# Patient Record
Sex: MALE | Race: WHITE | NOT HISPANIC OR LATINO | Employment: FULL TIME | URBAN - METROPOLITAN AREA
[De-identification: names, ages, dates, MRNs, and addresses within clinical notes are randomized per-mention and may not be internally consistent; named-entity substitution may affect disease eponyms.]

---

## 2019-06-04 ENCOUNTER — HOSPITAL ENCOUNTER (EMERGENCY)
Facility: HOSPITAL | Age: 29
End: 2019-06-06
Attending: EMERGENCY MEDICINE | Admitting: EMERGENCY MEDICINE
Payer: COMMERCIAL

## 2019-06-04 DIAGNOSIS — F32.A DEPRESSION: Primary | ICD-10-CM

## 2019-06-04 DIAGNOSIS — R44.3 HALLUCINATIONS: ICD-10-CM

## 2019-06-04 LAB
AMPHETAMINES SERPL QL SCN: NEGATIVE
BARBITURATES UR QL: NEGATIVE
BENZODIAZ UR QL: NEGATIVE
COCAINE UR QL: NEGATIVE
ETHANOL EXG-MCNC: 0 MG/DL
METHADONE UR QL: NEGATIVE
OPIATES UR QL SCN: NEGATIVE
PCP UR QL: NEGATIVE
THC UR QL: NEGATIVE

## 2019-06-04 PROCEDURE — 99285 EMERGENCY DEPT VISIT HI MDM: CPT

## 2019-06-04 PROCEDURE — 82075 ASSAY OF BREATH ETHANOL: CPT | Performed by: EMERGENCY MEDICINE

## 2019-06-04 PROCEDURE — 80307 DRUG TEST PRSMV CHEM ANLYZR: CPT | Performed by: EMERGENCY MEDICINE

## 2019-06-04 PROCEDURE — 99284 EMERGENCY DEPT VISIT MOD MDM: CPT | Performed by: EMERGENCY MEDICINE

## 2019-06-05 RX ORDER — NICOTINE 21 MG/24HR
21 PATCH, TRANSDERMAL 24 HOURS TRANSDERMAL ONCE
Status: DISCONTINUED | OUTPATIENT
Start: 2019-06-05 | End: 2019-06-06 | Stop reason: HOSPADM

## 2019-06-05 RX ORDER — ACETAMINOPHEN 325 MG/1
975 TABLET ORAL ONCE
Status: COMPLETED | OUTPATIENT
Start: 2019-06-05 | End: 2019-06-05

## 2019-06-05 RX ORDER — ACETAMINOPHEN 325 MG/1
650 TABLET ORAL ONCE
Status: COMPLETED | OUTPATIENT
Start: 2019-06-05 | End: 2019-06-05

## 2019-06-05 RX ORDER — TRAZODONE HYDROCHLORIDE 50 MG/1
50 TABLET ORAL
COMMUNITY

## 2019-06-05 RX ORDER — QUETIAPINE FUMARATE 25 MG/1
25 TABLET, FILM COATED ORAL 2 TIMES DAILY
COMMUNITY

## 2019-06-05 RX ORDER — IBUPROFEN 600 MG/1
600 TABLET ORAL ONCE
Status: COMPLETED | OUTPATIENT
Start: 2019-06-05 | End: 2019-06-05

## 2019-06-05 RX ORDER — OLANZAPINE 10 MG/1
10 TABLET, ORALLY DISINTEGRATING ORAL ONCE
Status: COMPLETED | OUTPATIENT
Start: 2019-06-05 | End: 2019-06-05

## 2019-06-05 RX ADMIN — ACETAMINOPHEN 650 MG: 325 TABLET ORAL at 12:03

## 2019-06-05 RX ADMIN — ACETAMINOPHEN 975 MG: 325 TABLET ORAL at 00:42

## 2019-06-05 RX ADMIN — IBUPROFEN 600 MG: 600 TABLET ORAL at 00:42

## 2019-06-05 RX ADMIN — NICOTINE 21 MG: 21 PATCH, EXTENDED RELEASE TRANSDERMAL at 11:29

## 2019-06-05 RX ADMIN — OLANZAPINE 10 MG: 10 TABLET, ORALLY DISINTEGRATING ORAL at 23:22

## 2019-06-06 VITALS
SYSTOLIC BLOOD PRESSURE: 114 MMHG | HEART RATE: 62 BPM | DIASTOLIC BLOOD PRESSURE: 56 MMHG | TEMPERATURE: 98.3 F | RESPIRATION RATE: 16 BRPM | OXYGEN SATURATION: 97 %

## 2022-12-12 ENCOUNTER — HOSPITAL ENCOUNTER (INPATIENT)
Facility: HOSPITAL | Age: 32
LOS: 9 days | Discharge: HOME | End: 2022-12-21
Attending: EMERGENCY MEDICINE | Admitting: STUDENT IN AN ORGANIZED HEALTH CARE EDUCATION/TRAINING PROGRAM

## 2022-12-12 ENCOUNTER — APPOINTMENT (EMERGENCY)
Dept: NON INVASIVE DIAGNOSTICS | Facility: HOSPITAL | Age: 32
End: 2022-12-12

## 2022-12-12 ENCOUNTER — APPOINTMENT (EMERGENCY)
Dept: RADIOLOGY | Facility: HOSPITAL | Age: 32
End: 2022-12-12

## 2022-12-12 ENCOUNTER — APPOINTMENT (EMERGENCY)
Dept: CT IMAGING | Facility: HOSPITAL | Age: 32
End: 2022-12-12

## 2022-12-12 DIAGNOSIS — R79.89 ELEVATED LFTS: ICD-10-CM

## 2022-12-12 DIAGNOSIS — E87.1 HYPONATREMIA: ICD-10-CM

## 2022-12-12 DIAGNOSIS — F19.10 SUBSTANCE ABUSE (HCC): ICD-10-CM

## 2022-12-12 DIAGNOSIS — M62.82 RHABDOMYOLYSIS: Primary | ICD-10-CM

## 2022-12-12 DIAGNOSIS — F17.200 TOBACCO DEPENDENCE: ICD-10-CM

## 2022-12-12 DIAGNOSIS — N19 KIDNEY FAILURE: ICD-10-CM

## 2022-12-12 DIAGNOSIS — M79.662 PAIN AND SWELLING OF LEFT LOWER LEG: ICD-10-CM

## 2022-12-12 DIAGNOSIS — B19.20 HEPATITIS C VIRUS INFECTION WITHOUT HEPATIC COMA: ICD-10-CM

## 2022-12-12 DIAGNOSIS — R93.0 ABNORMAL CT SCAN OF HEAD: ICD-10-CM

## 2022-12-12 DIAGNOSIS — M79.89 PAIN AND SWELLING OF LEFT LOWER LEG: ICD-10-CM

## 2022-12-12 DIAGNOSIS — N17.9 AKI (ACUTE KIDNEY INJURY) (HCC): ICD-10-CM

## 2022-12-12 DIAGNOSIS — E86.0 DEHYDRATION: ICD-10-CM

## 2022-12-12 PROBLEM — M79.606 LEG PAIN: Status: ACTIVE | Noted: 2022-12-12

## 2022-12-12 LAB
ALBUMIN SERPL BCP-MCNC: 3.3 G/DL (ref 3.5–5)
ALP SERPL-CCNC: 73 U/L (ref 46–116)
ALT SERPL W P-5'-P-CCNC: 593 U/L (ref 12–78)
AMORPH URATE CRY URNS QL MICRO: NORMAL /HPF
AMPHETAMINES SERPL QL SCN: NEGATIVE
ANION GAP SERPL CALCULATED.3IONS-SCNC: 14 MMOL/L (ref 4–13)
AST SERPL W P-5'-P-CCNC: 1848 U/L (ref 5–45)
BACTERIA UR QL AUTO: NORMAL /HPF
BARBITURATES UR QL: NEGATIVE
BASOPHILS # BLD AUTO: 0.01 THOUSANDS/ÂΜL (ref 0–0.1)
BASOPHILS NFR BLD AUTO: 0 % (ref 0–1)
BENZODIAZ UR QL: NEGATIVE
BILIRUB SERPL-MCNC: 0.39 MG/DL (ref 0.2–1)
BILIRUB UR QL STRIP: NEGATIVE
BUN SERPL-MCNC: 86 MG/DL (ref 5–25)
CALCIUM ALBUM COR SERPL-MCNC: 8.9 MG/DL (ref 8.3–10.1)
CALCIUM SERPL-MCNC: 8.3 MG/DL (ref 8.3–10.1)
CHLORIDE SERPL-SCNC: 94 MMOL/L (ref 96–108)
CK MB SERPL-MCNC: 544.7 NG/ML (ref 0–5)
CK MB SERPL-MCNC: <1 % (ref 0–2.5)
CK SERPL-CCNC: ABNORMAL U/L (ref 39–308)
CLARITY UR: CLEAR
CO2 SERPL-SCNC: 20 MMOL/L (ref 21–32)
COCAINE UR QL: POSITIVE
COLOR UR: YELLOW
CREAT SERPL-MCNC: 6.46 MG/DL (ref 0.6–1.3)
EOSINOPHIL # BLD AUTO: 0 THOUSAND/ÂΜL (ref 0–0.61)
EOSINOPHIL NFR BLD AUTO: 0 % (ref 0–6)
ERYTHROCYTE [DISTWIDTH] IN BLOOD BY AUTOMATED COUNT: 14 % (ref 11.6–15.1)
GFR SERPL CREATININE-BSD FRML MDRD: 10 ML/MIN/1.73SQ M
GLUCOSE SERPL-MCNC: 98 MG/DL (ref 65–140)
GLUCOSE UR STRIP-MCNC: NEGATIVE MG/DL
HCT VFR BLD AUTO: 37.4 % (ref 36.5–49.3)
HGB BLD-MCNC: 12.7 G/DL (ref 12–17)
HGB UR QL STRIP.AUTO: ABNORMAL
IMM GRANULOCYTES # BLD AUTO: 0.06 THOUSAND/UL (ref 0–0.2)
IMM GRANULOCYTES NFR BLD AUTO: 1 % (ref 0–2)
INR PPP: 1.02 (ref 0.84–1.19)
KETONES UR STRIP-MCNC: NEGATIVE MG/DL
LEUKOCYTE ESTERASE UR QL STRIP: NEGATIVE
LYMPHOCYTES # BLD AUTO: 1.28 THOUSANDS/ÂΜL (ref 0.6–4.47)
LYMPHOCYTES NFR BLD AUTO: 12 % (ref 14–44)
MCH RBC QN AUTO: 29.2 PG (ref 26.8–34.3)
MCHC RBC AUTO-ENTMCNC: 34 G/DL (ref 31.4–37.4)
MCV RBC AUTO: 86 FL (ref 82–98)
METHADONE UR QL: NEGATIVE
MONOCYTES # BLD AUTO: 0.74 THOUSAND/ÂΜL (ref 0.17–1.22)
MONOCYTES NFR BLD AUTO: 7 % (ref 4–12)
NEUTROPHILS # BLD AUTO: 8.81 THOUSANDS/ÂΜL (ref 1.85–7.62)
NEUTS SEG NFR BLD AUTO: 80 % (ref 43–75)
NITRITE UR QL STRIP: NEGATIVE
NON-SQ EPI CELLS URNS QL MICRO: NORMAL /HPF
NRBC BLD AUTO-RTO: 0 /100 WBCS
OPIATES UR QL SCN: NEGATIVE
OXYCODONE+OXYMORPHONE UR QL SCN: NEGATIVE
PCP UR QL: NEGATIVE
PH UR STRIP.AUTO: 5 [PH] (ref 4.5–8)
PLATELET # BLD AUTO: 285 THOUSANDS/UL (ref 149–390)
PLATELET # BLD AUTO: 305 THOUSANDS/UL (ref 149–390)
PMV BLD AUTO: 9.4 FL (ref 8.9–12.7)
PMV BLD AUTO: 9.7 FL (ref 8.9–12.7)
POTASSIUM SERPL-SCNC: 5.1 MMOL/L (ref 3.5–5.3)
PROT SERPL-MCNC: 7.5 G/DL (ref 6.4–8.4)
PROT UR STRIP-MCNC: ABNORMAL MG/DL
PROTHROMBIN TIME: 13.4 SECONDS (ref 11.6–14.5)
RBC # BLD AUTO: 4.35 MILLION/UL (ref 3.88–5.62)
RBC #/AREA URNS AUTO: NORMAL /HPF
SODIUM SERPL-SCNC: 128 MMOL/L (ref 135–147)
SP GR UR STRIP.AUTO: 1.02 (ref 1–1.03)
THC UR QL: NEGATIVE
UROBILINOGEN UR QL STRIP.AUTO: 0.2 E.U./DL
WBC # BLD AUTO: 10.9 THOUSAND/UL (ref 4.31–10.16)
WBC #/AREA URNS AUTO: NORMAL /HPF

## 2022-12-12 RX ORDER — ACETAMINOPHEN 325 MG/1
650 TABLET ORAL EVERY 6 HOURS PRN
Status: DISCONTINUED | OUTPATIENT
Start: 2022-12-12 | End: 2022-12-12

## 2022-12-12 RX ORDER — OXYCODONE HYDROCHLORIDE 10 MG/1
10 TABLET ORAL EVERY 6 HOURS PRN
Status: DISCONTINUED | OUTPATIENT
Start: 2022-12-12 | End: 2022-12-21 | Stop reason: HOSPADM

## 2022-12-12 RX ORDER — OXYCODONE HYDROCHLORIDE 5 MG/1
5 TABLET ORAL EVERY 6 HOURS PRN
Status: DISCONTINUED | OUTPATIENT
Start: 2022-12-12 | End: 2022-12-21 | Stop reason: HOSPADM

## 2022-12-12 RX ORDER — OXYCODONE HYDROCHLORIDE 10 MG/1
10 TABLET ORAL EVERY 4 HOURS PRN
Status: DISCONTINUED | OUTPATIENT
Start: 2022-12-12 | End: 2022-12-12

## 2022-12-12 RX ORDER — SODIUM CHLORIDE, SODIUM GLUCONATE, SODIUM ACETATE, POTASSIUM CHLORIDE, MAGNESIUM CHLORIDE, SODIUM PHOSPHATE, DIBASIC, AND POTASSIUM PHOSPHATE .53; .5; .37; .037; .03; .012; .00082 G/100ML; G/100ML; G/100ML; G/100ML; G/100ML; G/100ML; G/100ML
125 INJECTION, SOLUTION INTRAVENOUS CONTINUOUS
Status: DISCONTINUED | OUTPATIENT
Start: 2022-12-12 | End: 2022-12-13

## 2022-12-12 RX ORDER — OXYCODONE HYDROCHLORIDE 5 MG/1
5 TABLET ORAL EVERY 4 HOURS PRN
Status: DISCONTINUED | OUTPATIENT
Start: 2022-12-12 | End: 2022-12-12

## 2022-12-12 RX ORDER — HYDROMORPHONE HCL/PF 1 MG/ML
0.5 SYRINGE (ML) INJECTION EVERY 6 HOURS PRN
Status: DISCONTINUED | OUTPATIENT
Start: 2022-12-12 | End: 2022-12-21 | Stop reason: HOSPADM

## 2022-12-12 RX ORDER — HEPARIN SODIUM 5000 [USP'U]/ML
5000 INJECTION, SOLUTION INTRAVENOUS; SUBCUTANEOUS EVERY 8 HOURS SCHEDULED
Status: DISCONTINUED | OUTPATIENT
Start: 2022-12-12 | End: 2022-12-21 | Stop reason: HOSPADM

## 2022-12-12 RX ORDER — NICOTINE 21 MG/24HR
1 PATCH, TRANSDERMAL 24 HOURS TRANSDERMAL DAILY
Status: DISCONTINUED | OUTPATIENT
Start: 2022-12-13 | End: 2022-12-21 | Stop reason: HOSPADM

## 2022-12-12 RX ADMIN — HEPARIN SODIUM 5000 UNITS: 5000 INJECTION INTRAVENOUS; SUBCUTANEOUS at 17:00

## 2022-12-12 RX ADMIN — SODIUM CHLORIDE 1000 ML: 0.9 INJECTION, SOLUTION INTRAVENOUS at 14:11

## 2022-12-12 RX ADMIN — SODIUM CHLORIDE 1000 ML: 0.9 INJECTION, SOLUTION INTRAVENOUS at 12:09

## 2022-12-12 RX ADMIN — SODIUM CHLORIDE, SODIUM GLUCONATE, SODIUM ACETATE, POTASSIUM CHLORIDE AND MAGNESIUM CHLORIDE 125 ML/HR: 526; 502; 368; 37; 30 INJECTION, SOLUTION INTRAVENOUS at 19:39

## 2022-12-12 RX ADMIN — ACETAMINOPHEN 650 MG: 325 TABLET, FILM COATED ORAL at 17:14

## 2022-12-12 NOTE — ED ATTENDING ATTESTATION
12/12/2022  IErnestina DO, saw and evaluated the patient  I have discussed the patient with the resident/non-physician practitioner and agree with the resident's/non-physician practitioner's findings, Plan of Care, and MDM as documented in the resident's/non-physician practitioner's note, except where noted  All available labs and Radiology studies were reviewed  I was present for key portions of any procedure(s) performed by the resident/non-physician practitioner and I was immediately available to provide assistance  At this point I agree with the current assessment done in the Emergency Department  I have conducted an independent evaluation of this patient a history and physical is as follows:    32 yo M h/o polysubstance abuse presenting for evaluation of L leg pain  Pt recently in rehab, just left 12/6  He has contusion to forehead and L leg pain, he is not sure what happened  He denies any ETOH/drug use, states taking Suboxone as directed  Has been unable to get out of bed/not eating/drinking       Exam sig for frontal hematoma/abrasion, no open wound/ttp over temporal region, LLE: no skin changes, no crepitus, mild ttp to L thigh, distally NV intact, all compartments are soft    Workup sig for acute renal failure 2/2 rhabdo, aggressive IVF and admission    ED Course         Critical Care Time  Procedures
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
## 2022-12-12 NOTE — UTILIZATION REVIEW
NOTIFICATION OF INPATIENT ADMISSION   AUTHORIZATION REQUEST   SERVICING FACILITY:   [address] [place] [address], [address]  Tax ID: [id number]  NPI: [id number] ATTENDING PROVIDER:  Attending Name and NPI#: [facility] [[id number][facility]  Address: [address] [place] [address], [address]   Phone: [phone]     ADMISSION INFORMATION:  Place of Service: [place] [address]  Place of Service Code: 21  Inpatient Admission Date/Time: 12/12/22  3:53 PM  Discharge Date/Time: No discharge date for patient encounter  Admitting Diagnosis Code/Description:  Leg pain [M79 [id number]]     UTILIZATION REVIEW CONTACT:  Argelia Todd Utilization   Network Utilization Review Department  Phone: [phone]  Fax: [phone]  Email: [email or web address]  Contact for approvals/pending authorizations, clinical reviews, and discharge  PHYSICIAN ADVISORY SERVICES:  Medical Necessity Denial & Tojp-sc-Enwj Review  Phone: [phone]  Fax: [phone]  Email: [email or web address]
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
## 2022-12-12 NOTE — ASSESSMENT & PLAN NOTE
History of IV drug abuse, UDS positive for cocaine  Previously on Suboxone, has been off it for several days, will hold off at this time  Continue to monitor for withdrawals
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
## 2022-12-12 NOTE — ASSESSMENT & PLAN NOTE
Baseline creatinine of 1, elevated in setting of rhabdo  Appreciate nephro input, continue IV fluids

## 2022-12-12 NOTE — CASE MANAGEMENT
Case Management Assessment & Discharge Planning Note    Patient name Cristi Chaudhry  Location ED 15/ED 15 MRN [id number]  : 1990 Date 2022       Current Admission Date: 2022  Current Admission Diagnosis:Rhabdomyolysis   Patient Active Problem List    Diagnosis Date Noted   • Rhabdomyolysis 2022   • SUSANA (acute kidney injury) ([facility] [address] ) 2022   • Substance abuse ([facility] [address] ) 2022   • Leg pain 2022   • Elevated LFTs 2022      LOS (days): 0  Geometric Mean LOS (GMLOS) (days):   Days to GMLOS:     OBJECTIVE:    Risk of Unplanned Readmission Score: 13 72         Current admission status: Inpatient       Preferred Pharmacy:   [address] [place] [address] [address] [place] [id number]  Phone: [phone] Fax: [phone]    Primary Care Provider: Moraima Flor PA-C    Primary Insurance: Lizzie Cho [facility]  Secondary Insurance:     ASSESSMENT:  [facility] [address], [address] [place] [address] Representative   Primary Phone: [phone] (Home)               Advance Directives  Does patient have a [address] [place] [address] Avenue?: No  Was patient offered paperwork?: Yes (declined)  Does patient currently have a Health Care decision maker?: Yes, please see Health Care Proxy section  Does patient have Advance Directives?: No  Was patient offered paperwork?: Yes (declined)  Primary Contact: Amy Marte [phone]              Patient Information  Admitted from[de-identified] Home  Mental Status: Alert  During Assessment patient was accompanied by: Not accompanied during assessment  Assessment information provided by[de-identified] Patient  Primary Caregiver: Self  Support Systems: Self, Parent  [place] of Residence: [address] do you live in?: [address] [facility] entry access options   Select all that apply : No steps to enter home  Type of Current Residence: [place] Agosto  In the last 12 months, was there a time when you were not able to pay the mortgage or rent on time?: No  In the last 12 months, how many places have you lived?: 1  In the last 12 months, was there a time when you did not have a steady place to sleep or slept in a shelter (including now)?: No  Homeless/housing insecurity resource given?: N/A  Living Arrangements: Lives w/ Parent(s)  Is patient a ?: No    Activities of Daily Living Prior to Admission  Functional Status: Independent  Completes ADLs independently?: Yes  Ambulates independently?: Yes  Does patient use assisted devices?: No  Does patient currently own DME?: No  Does patient have a history of Outpatient Therapy (PT/OT)?: No  Does the patient have a history of Short-Term Rehab?: No  Does patient have a history of HHC?: No  Does patient currently have [facility]?: No         Patient Information Continued  Income Source: Employed  Does patient have prescription coverage?: Yes  Within the past 12 months, you worried that your food would run out before you got the money to buy more : Never true  Within the past 12 months, the food you bought just didn't last and you didn't have money to get more : Never true  Food insecurity resource given?: N/A  Does patient receive dialysis treatments?: No  Does patient have a history of substance abuse?: Yes  Historical substance use preference: Cocaine  History of Withdrawal Symptoms: Denies past symptoms  Is patient currently in treatment for substance abuse?: Yes (Pt was d/c from rehab on 12/6   Pt is enrolled in [facility])  Does patient have a history of Mental Health Diagnosis?: No         Means of Transportation  Means of Transport to Appts[de-identified] Family transport  In the past 12 months, has lack of transportation kept you from medical appointments or from getting medications?: No  In the past 12 months, has lack of transportation kept you from meetings, work, or from getting things needed for daily living?: No  Was application for public transport provided?: N/A        DISCHARGE DETAILS:    Discharge planning discussed with[de-identified] Patient  Freedom of Choice: Yes     CM contacted family/caregiver?: No- see comments (declined)                  Requested [address] [facility]         Is the patient interested in [facility] at discharge?: No    DME Referral Provided  Referral made for DME?: No                                                           Additional Comments: CM met with pt at bedside  Pt denied needing any information for D+A and was not interested in HOST information  Pt reported being in treatment through [facility]  Pt reported family transport home once ready for d/c

## 2022-12-13 PROBLEM — D64.9 ANEMIA: Status: ACTIVE | Noted: 2022-12-13

## 2022-12-13 LAB
ALBUMIN SERPL BCP-MCNC: 2.8 G/DL (ref 3.5–5)
ALP SERPL-CCNC: 62 U/L (ref 46–116)
ALT SERPL W P-5'-P-CCNC: 463 U/L (ref 12–78)
ANION GAP SERPL CALCULATED.3IONS-SCNC: 18 MMOL/L (ref 4–13)
AST SERPL W P-5'-P-CCNC: 1121 U/L (ref 5–45)
BILIRUB SERPL-MCNC: 0.36 MG/DL (ref 0.2–1)
BUN SERPL-MCNC: 96 MG/DL (ref 5–25)
CALCIUM ALBUM COR SERPL-MCNC: 8.9 MG/DL (ref 8.3–10.1)
CALCIUM SERPL-MCNC: 7.9 MG/DL (ref 8.3–10.1)
CHLORIDE SERPL-SCNC: 98 MMOL/L (ref 96–108)
CK MB SERPL-MCNC: 180 NG/ML (ref 0–5)
CK MB SERPL-MCNC: 182.1 NG/ML (ref 0–5)
CK MB SERPL-MCNC: <1 % (ref 0–2.5)
CK MB SERPL-MCNC: <1 % (ref 0–2.5)
CK SERPL-CCNC: ABNORMAL U/L (ref 39–308)
CK SERPL-CCNC: ABNORMAL U/L (ref 39–308)
CO2 SERPL-SCNC: 17 MMOL/L (ref 21–32)
CREAT SERPL-MCNC: 7.58 MG/DL (ref 0.6–1.3)
ERYTHROCYTE [DISTWIDTH] IN BLOOD BY AUTOMATED COUNT: 13.9 % (ref 11.6–15.1)
FLUAV RNA RESP QL NAA+PROBE: NEGATIVE
FLUBV RNA RESP QL NAA+PROBE: NEGATIVE
GFR SERPL CREATININE-BSD FRML MDRD: 8 ML/MIN/1.73SQ M
GLUCOSE SERPL-MCNC: 95 MG/DL (ref 65–140)
HCT VFR BLD AUTO: 31.6 % (ref 36.5–49.3)
HGB BLD-MCNC: 10.8 G/DL (ref 12–17)
MCH RBC QN AUTO: 28.8 PG (ref 26.8–34.3)
MCHC RBC AUTO-ENTMCNC: 34.2 G/DL (ref 31.4–37.4)
MCV RBC AUTO: 84 FL (ref 82–98)
PLATELET # BLD AUTO: 279 THOUSANDS/UL (ref 149–390)
PMV BLD AUTO: 9.4 FL (ref 8.9–12.7)
POTASSIUM SERPL-SCNC: 4.6 MMOL/L (ref 3.5–5.3)
PROT SERPL-MCNC: 6.5 G/DL (ref 6.4–8.4)
RBC # BLD AUTO: 3.75 MILLION/UL (ref 3.88–5.62)
RSV RNA RESP QL NAA+PROBE: NEGATIVE
SARS-COV-2 RNA RESP QL NAA+PROBE: NEGATIVE
SODIUM SERPL-SCNC: 133 MMOL/L (ref 135–147)
WBC # BLD AUTO: 9.04 THOUSAND/UL (ref 4.31–10.16)

## 2022-12-13 RX ADMIN — SODIUM CHLORIDE, SODIUM GLUCONATE, SODIUM ACETATE, POTASSIUM CHLORIDE AND MAGNESIUM CHLORIDE 125 ML/HR: 526; 502; 368; 37; 30 INJECTION, SOLUTION INTRAVENOUS at 07:01

## 2022-12-13 RX ADMIN — HEPARIN SODIUM 5000 UNITS: 5000 INJECTION INTRAVENOUS; SUBCUTANEOUS at 01:28

## 2022-12-13 RX ADMIN — HEPARIN SODIUM 5000 UNITS: 5000 INJECTION INTRAVENOUS; SUBCUTANEOUS at 08:28

## 2022-12-13 RX ADMIN — SODIUM BICARBONATE 125 ML/HR: 84 INJECTION, SOLUTION INTRAVENOUS at 11:36

## 2022-12-13 RX ADMIN — HEPARIN SODIUM 5000 UNITS: 5000 INJECTION INTRAVENOUS; SUBCUTANEOUS at 14:09

## 2022-12-13 RX ADMIN — HEPARIN SODIUM 5000 UNITS: 5000 INJECTION INTRAVENOUS; SUBCUTANEOUS at 22:09

## 2022-12-13 RX ADMIN — SODIUM BICARBONATE 125 ML/HR: 84 INJECTION, SOLUTION INTRAVENOUS at 20:25

## 2022-12-13 NOTE — ASSESSMENT & PLAN NOTE
· History of IV drug abuse, UDS positive for cocaine  · Previously on Suboxone, has been off it for several days, will hold off at this time  · Continue to monitor for withdrawals

## 2022-12-13 NOTE — PROGRESS NOTES
[facility]  Progress Note - Leida Garcia 1990, 33 y o  male MRN: [id number]  Unit/Bed#: ED 15 Encounter: [id number]  Primary Care Provider: Rema Chase PA-C   Date and time admitted to hospital: 12/12/2022 11:12 AM    * Rhabdomyolysis  Assessment & Plan  · Patient presented for LLE pain and tingling especially in the gluteal region  States he "woke up in pain" but doesn't recall inciting events  Does have facial scrapes so cannot exclude trauma   · CK > 70,000 decreased to 50,000 on IVF   · Changed to sodium bicarb in the setting of acute renal failure and acidosis   · Kidney function unfortunately is worsening with creatinine greater than 7   · Consented by nephrology for possible renal replacement the next 24 to 48 hours  · 10 you IV fluids and to trend creatinine as well as CK  · UDS positive for cocaine, possibly contributing    SUSANA (acute kidney injury) ([facility] [address] )  Assessment & Plan  · Baseline creatinine of 1  · Unfortunately overnight worsened from 6 46 to 7 58  ·  elevated in setting of rhabdo  · Nephrology consulted   · Fluids changed to sodium bicarb given acidosis   · Consented for possible dialysis     Elevated LFTs  Assessment & Plan  · Likely in setting of rhabdo  · He does have a history of untreated hep c secondary to IVDU   · Given improvement will monitor x 1 day  · Consider GI consult if worsening   · No concerning liver pathology noted on CT     Anemia  Assessment & Plan  · Not many records but baseline appears to be around 12  · Currently 10 8   · Likely dilutional in the setting of aggressive fluids repletion due to rhabdo   · Monitor closely   · No clinical signs of bleeding       Leg pain  Assessment & Plan  · Doppler ultrasound negative for DVT   · CT imaging showing subcu edema, swelling with suspicions of post trauma  · Suspect likely due to myalgias in setting of rhabdo, low suspicion for infection, hold antibiotics  · Patient's lower extremities warm and well perfused with +2 pedal pulses so doubt compartment syndrome at this time  Substance abuse ([facility] [address] )  Assessment & Plan  · History of IV drug abuse, UDS positive for cocaine  · Previously on Suboxone, has been off it for several days, will hold off at this time  · Continue to monitor for withdrawals      VTE Pharmacologic Prophylaxis:   Moderate Risk (Score 3-4) - Pharmacological DVT Prophylaxis Ordered: heparin  Patient Centered Rounds: I performed bedside rounds with nursing staff today  Discussions with Specialists or Other Care Team Provider: GLADIS      Education and Discussions with Family / Patient: Updated  (mother) via phone  Time Spent for Care: 30 minutes  More than 50% of total time spent on counseling and coordination of care as described above  Current Length of Stay: 1 day(s)  Current Patient Status: Inpatient   Certification Statement: The patient will continue to require additional inpatient hospital stay due to pending improvement in renal function   Discharge Plan: Anticipate discharge in >72 hrs to pending clinical coarse     Code Status: Level 1 - Full Code    Subjective:   Patient is complaining of pins and needle like feeling in his buttock      Objective:     Vitals:   Temp (24hrs), Av 1 °F (36 7 °C), Min:97 7 °F (36 5 °C), Max:98 4 °F (36 9 °C)    Temp:  [97 7 °F (36 5 °C)-98 4 °F (36 9 °C)] 97 7 °F (36 5 °C)  HR:  [74-90] 83  Resp:  [18-22] 18  BP: (125-164)/(63-94) 138/71  SpO2:  [98 %-100 %] 99 %  Body mass index is 31 07 kg/m²  Input and Output Summary (last 24 hours): Intake/Output Summary (Last 24 hours) at 2022 1228  Last data filed at 2022 1136  Gross per 24 hour   Intake 4172 92 ml   Output 1175 ml   Net 2997 92 ml       Physical Exam:   Physical Exam  Vitals and nursing note reviewed  Constitutional:       General: He is not in acute distress  Appearance: He is ill-appearing  HENT:      Head: Normocephalic and atraumatic  Cardiovascular:      Rate and Rhythm: Normal rate and regular rhythm  Pulmonary:      Effort: Pulmonary effort is normal       Breath sounds: Normal breath sounds  Abdominal:      General: Abdomen is flat  Palpations: Abdomen is soft  Musculoskeletal:         General: Tenderness (left buttock tenderness ) present  Comments: + 2 pedal pulses    Skin:     General: Skin is warm and dry  Capillary Refill: Capillary refill takes less than 2 seconds  Neurological:      General: No focal deficit present  Mental Status: He is alert and oriented to person, place, and time  Psychiatric:         Mood and Affect: Mood normal           Additional Data:     Labs:  Results from last 7 days   Lab Units 12/13/22  0614 12/12/22  1659 12/12/22  1208   WBC Thousand/uL 9 04  --  10 90*   HEMOGLOBIN g/dL 10 8*  --  12 7   HEMATOCRIT % 31 6*  --  37 4   PLATELETS Thousands/uL 279   < > 305   NEUTROS PCT %  --   --  80*   LYMPHS PCT %  --   --  12*   MONOS PCT %  --   --  7   EOS PCT %  --   --  0    < > = values in this interval not displayed       Results from last 7 days   Lab Units 12/13/22  0614   SODIUM mmol/L 133*   POTASSIUM mmol/L 4 6   CHLORIDE mmol/L 98   CO2 mmol/L 17*   BUN mg/dL 96*   CREATININE mg/dL 7 58*   ANION GAP mmol/L 18*   CALCIUM mg/dL 7 9*   ALBUMIN g/dL 2 8*   TOTAL BILIRUBIN mg/dL 0 36   ALK PHOS U/L 62   ALT U/L 463*   AST U/L 1,121*   GLUCOSE RANDOM mg/dL 95     Results from last 7 days   Lab Units 12/12/22  1342   INR  1 02                   Lines/Drains:  Invasive Devices     Peripheral Intravenous Line  Duration           Peripheral IV 12/12/22 Right Antecubital 1 day    Peripheral IV 12/12/22 Left;Upper Arm <1 day                  Telemetry:  Telemetry Orders (From admission, onward)             24 Hour Telemetry Monitoring  (ED Bridging Orders Panel)  Continuous x 24 Hours (Telem)        Comments: Lab and electrolyte abnormalities   References:    Telemetry Guidelines Question:  Reason for 24 Hour Telemetry  Answer:  Metabolic/Electrolyte Disturbance with High Probability of Dysrhythmia (K level <3 or >6, or KCL infusion >=10mEq/hr)                 Telemetry Reviewed: Normal Sinus Rhythm  Indication for Continued Telemetry Use: No indication for continued use  Will discontinue  Imaging: No pertinent imaging reviewed  Recent Cultures (last 7 days):         Last 24 Hours Medication List:   Current Facility-Administered Medications   Medication Dose Route Frequency Provider Last Rate   • heparin (porcine)  5,000 Units Subcutaneous Q8H [facility] Juan Reynolds MD     • HYDROmorphone  0 5 mg Intravenous Q6H PRN Juan Reynolds MD     • nicotine  1 patch Transdermal Daily Juan Reynolds MD     • oxyCODONE  10 mg Oral Q6H PRN Juan Reynolds MD     • oxyCODONE  5 mg Oral Q6H PRGALO Reynolds MD     • sodium bicarbonate infusion  125 mL/hr Intravenous Continuous Claudell [place], CRNP 125 mL/hr (12/13/22 1136)   • sodium chloride  1,000 mL Intravenous Once Varsha Mukherjee PA-C          Today, Patient Was Seen By: Gibran García PA-C    **Please Note: This note may have been constructed using a voice recognition system  **

## 2022-12-13 NOTE — ASSESSMENT & PLAN NOTE
· Baseline creatinine of 1  · Unfortunately overnight worsened from 6 46 to 7 58  ·  elevated in setting of rhabdo  · Nephrology consulted   · Fluids changed to sodium bicarb given acidosis   · Consented for possible dialysis

## 2022-12-13 NOTE — ASSESSMENT & PLAN NOTE
· Doppler ultrasound negative for DVT   · CT imaging showing subcu edema, swelling with suspicions of post trauma  · Suspect likely due to myalgias in setting of rhabdo, low suspicion for infection, hold antibiotics  · Patient's lower extremities warm and well perfused with +2 pedal pulses so doubt compartment syndrome at this time

## 2022-12-13 NOTE — ASSESSMENT & PLAN NOTE
· Likely in setting of rhabdo  · He does have a history of untreated hep c secondary to IVDU   · Given improvement will monitor x 1 day  · Consider GI consult if worsening   · No concerning liver pathology noted on CT

## 2022-12-13 NOTE — ASSESSMENT & PLAN NOTE
· Patient presented for LLE pain and tingling especially in the gluteal region  States he "woke up in pain" but doesn't recall inciting events   Does have facial scrapes so cannot exclude trauma   · CK > 70,000 decreased to 50,000 on IVF   · Changed to sodium bicarb in the setting of acute renal failure and acidosis   · Kidney function unfortunately is worsening with creatinine greater than 7   · Consented by nephrology for possible renal replacement the next 24 to 48 hours  · 10 you IV fluids and to trend creatinine as well as CK  · UDS positive for cocaine, possibly contributing

## 2022-12-13 NOTE — ASSESSMENT & PLAN NOTE
· Not many records but baseline appears to be around 12  · Currently 10 8   · Likely dilutional in the setting of aggressive fluids repletion due to rhabdo   · Monitor closely   · No clinical signs of bleeding
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
## 2022-12-13 NOTE — PLAN OF CARE
Problem: PAIN - ADULT  Goal: Verbalizes/displays adequate comfort level or baseline comfort level  Description: Interventions:  - Encourage patient to monitor pain and request assistance  - Assess pain using appropriate pain scale  - Administer analgesics based on type and severity of pain and evaluate response  - Implement non-pharmacological measures as appropriate and evaluate response  - Consider cultural and social influences on pain and pain management  - Notify physician/advanced practitioner if interventions unsuccessful or patient reports new pain  12/13/2022 1414 by Sheila Cortes RN  Outcome: Progressing  12/13/2022 1412 by Sheila Cortes RN  Outcome: Progressing     Problem: INFECTION - ADULT  Goal: Absence or prevention of progression during hospitalization  Description: INTERVENTIONS:  - Assess and monitor for signs and symptoms of infection  - Monitor lab/diagnostic results  - Monitor all insertion sites, i e  indwelling lines, tubes, and drains  - Monitor endotracheal if appropriate and nasal secretions for changes in amount and color  - [place] appropriate cooling/warming therapies per order  - Administer medications as ordered  - Instruct and encourage patient and family to use good hand hygiene technique  - Identify and instruct in appropriate isolation precautions for identified infection/condition  12/13/2022 1414 by Sheila Cortes RN  Outcome: Progressing  12/13/2022 1412 by Sheila Cortes RN  Outcome: Progressing     Problem: SAFETY ADULT  Goal: Patient will remain free of falls  Description: INTERVENTIONS:  - Educate patient/family on patient safety including physical limitations  - Instruct patient to call for assistance with activity   - Consult OT/PT to assist with strengthening/mobility   - Keep Call bell within reach  - Keep bed low and locked with side rails adjusted as appropriate  - Keep care items and personal belongings within reach  - Initiate and maintain comfort rounds  - Apply yellow socks and bracelet for high fall risk patients  - Consider moving patient to room near nurses station  12/13/2022 1414 by Lexy Brewster RN  Outcome: Progressing  12/13/2022 1412 by Lexy Brewster RN  Outcome: Progressing  Goal: Maintain or return to baseline ADL function  Description: INTERVENTIONS:  -  Assess patient's ability to carry out ADLs; assess patient's baseline for ADL function and identify physical deficits which impact ability to perform ADLs (bathing, care of mouth/teeth, toileting, grooming, dressing, etc )  - Assess/evaluate cause of self-care deficits   - Assess range of motion  - Assess patient's mobility; develop plan if impaired  - Assess patient's need for assistive devices and provide as appropriate  - Encourage maximum independence but intervene and supervise when necessary  - Involve family in performance of ADLs  - Assess for home care needs following discharge   - Consider OT consult to assist with ADL evaluation and planning for discharge  - Provide patient education as appropriate  12/13/2022 1414 by Lexy Brewster RN  Outcome: Progressing  12/13/2022 1412 by Lexy Brewster RN  Outcome: Progressing  Goal: Maintains/Returns to pre admission functional level  Description: INTERVENTIONS:  - Perform BMAT or MOVE assessment daily    - Set and communicate daily mobility goal to care team and patient/family/caregiver     - Collaborate with rehabilitation services on mobility goals if consulted  - Out of bed for meals 3 times a day  - Out of bed for toileting  - Record patient progress and toleration of activity level   12/13/2022 1414 by Lexy Brewster RN  Outcome: Progressing  12/13/2022 1412 by Lexy Brewster RN  Outcome: Progressing     Problem: DISCHARGE PLANNING  Goal: Discharge to home or other facility with appropriate resources  Description: INTERVENTIONS:  - Identify barriers to discharge w/patient and caregiver  - Arrange for needed discharge resources and transportation as appropriate  - Identify discharge learning needs (meds, wound care, etc )  - Arrange for interpretive services to assist at discharge as needed  - Refer to Case Management Department for coordinating discharge planning if the patient needs post-hospital services based on physician/advanced practitioner order or complex needs related to functional status, cognitive ability, or social support system  12/13/2022 1414 by Luis Daniel Scmhid RN  Outcome: Progressing  12/13/2022 1412 by Luis Daniel Schmid RN  Outcome: Progressing     Problem: Knowledge Deficit  Goal: Patient/family/caregiver demonstrates understanding of disease process, treatment plan, medications, and discharge instructions  Description: Complete learning assessment and assess knowledge base    Interventions:  - Provide teaching at level of understanding  - Provide teaching via preferred learning methods  12/13/2022 1414 by Luis Daniel Schmid RN  Outcome: Progressing  12/13/2022 1412 by Luis Daniel Schmid RN  Outcome: Progressing     Problem: METABOLIC, FLUID AND ELECTROLYTES - ADULT  Goal: Electrolytes maintained within normal limits  Description: INTERVENTIONS:  - Monitor labs and assess patient for signs and symptoms of electrolyte imbalances  - Administer electrolyte replacement as ordered  - Monitor response to electrolyte replacements, including repeat lab results as appropriate  - Instruct patient on fluid and nutrition as appropriate  12/13/2022 1414 by Luis Daniel Schmid RN  Outcome: Progressing  12/13/2022 1412 by Luis Daniel Schmid RN  Outcome: Progressing  Goal: Fluid balance maintained  Description: INTERVENTIONS:  - Monitor labs   - Monitor I/O and WT  - Instruct patient on fluid and nutrition as appropriate  - Assess for signs & symptoms of volume excess or deficit  12/13/2022 1414 by Luis Daniel Schmid RN  Outcome: Progressing  12/13/2022 1412 by Luis Daniel Schmid RN  Outcome: Progressing     Problem: MOBILITY - ADULT  Goal: Maintain or return to baseline ADL function  Description: INTERVENTIONS:  -  Assess patient's ability to carry out ADLs; assess patient's baseline for ADL function and identify physical deficits which impact ability to perform ADLs (bathing, care of mouth/teeth, toileting, grooming, dressing, etc )  - Assess/evaluate cause of self-care deficits   - Assess range of motion  - Assess patient's mobility; develop plan if impaired  - Assess patient's need for assistive devices and provide as appropriate  - Encourage maximum independence but intervene and supervise when necessary  - Involve family in performance of ADLs  - Assess for home care needs following discharge   - Consider OT consult to assist with ADL evaluation and planning for discharge  - Provide patient education as appropriate  12/13/2022 1414 by Lauryn Matute RN  Outcome: Progressing  12/13/2022 1412 by Lauryn Matute RN  Outcome: Progressing  Goal: Maintains/Returns to pre admission functional level  Description: INTERVENTIONS:  - Perform BMAT or MOVE assessment daily    - Set and communicate daily mobility goal to care team and patient/family/caregiver     - Collaborate with rehabilitation services on mobility goals if consulted  - Out of bed for meals 3 times a day  - Out of bed for toileting  - Record patient progress and toleration of activity level   12/13/2022 1414 by Lauryn Matute RN  Outcome: Progressing  12/13/2022 1412 by Lauryn Matute RN  Outcome: Progressing

## 2022-12-13 NOTE — CONSULTS
Consultation - Nephrology   Jessica Shukla 33 y o  male MRN: [id number]  Unit/Bed#: ED 15 Encounter: [id number]    ASSESSMENT/PLAN:    Severe SUSANA (POA)  -Baseline creatinine: 0 8-0 9 as reviewed in Care Everywhere  -Creatinine on admission 6 46, most recent creatinine 7 58  -Etiology: Suspect pigment nephropathy second to rhabdomyolysis  -UA: Large blood, 2+ protein, no RBCs  -Renal imaging: CT abdomen pelvis with severe bladder wall thickening, no hydronephrosis  -Avoid hypotension, avoid nephrotoxins, avoid NSAIDS  -Trend BMP  -Urinary retention protocol, bladder scans  -Reviewed with nursing will require strict I's and O's, noted to have 700 mL urine output thus far this morning  -Discussed risks and benefits of renal replacement therapy including infection, arrhythmia, hypotension, sudden cardiac death    Consent obtained from patient and placed in chart  -no current indication for renal placement therapy at this time, high risk for requiring in the to next 24 to 48 hours  -Stop Plasma-Lyte, will utilize bicarb infusion given acidosis    Anion gap metabolic acidosis  -The setting of severe SUSANA  -Bicarb 17, anion gap 18  -Stop Plasma-Lyte as above, start bicarb drip    Rhabdomyolysis/left leg pain  -Unclear origin, no clear inciting event, however patient has poor recall of the events leading to his hospitalization  -CK 74,000 on admission most recently 50,000  -Lower extremity venous duplex negative for DVT  -CT abdomen pelvis with significant subcutaneous edema throughout the left upper thigh with enlargement of all surrounding muscles  -Management per primary team    Anemia  -Hemoglobin 10 8 from 12 7 on admission after IV fluid administration  -Recommend transfuse goal greater than 7 per primary team    Hyponatremia  -Sodium 128 on admission, most recently 133  -Suspect second to SUSANA/impaired free water excretion  -Continue IV fluid resuscitation, trend labs, if drops further we will send additional work-up at that time    Transaminitis  -Likely secondary to rhabdomyolysis    Additional Medical Problems: Substance abuse    Patient asked me to update his mother Narciso Gillette via phone, left a voicemail asking her to call back the hospital for further information    HISTORY OF PRESENT ILLNESS:  Requesting Physician: Jethro Mcneill MD  Reason for Consult: SUSANA Mejía is a 33y o  year old male who was admitted to [facility] after presenting with left leg pain and decreased urination  Patient has minimal recall of the events leading to his hospitalization however reports he had left leg pain for few days and noticed that he was not urinating as much as he normally does, he reports his appetite have been normal prior to this, denies any recent diarrhea  A renal consultation is requested today for assistance in the management of acute kidney injury  PAST MEDICAL HISTORY:  Past Medical History:   Diagnosis Date   • Hepatitis C        PAST SURGICAL HISTORY:  History reviewed  No pertinent surgical history  ALLERGIES:  No Known Allergies    SOCIAL HISTORY:  Social History     Substance and Sexual Activity   Alcohol Use Yes    Comment: occassional     Social History     Substance and Sexual Activity   Drug Use Yes   • Types: Heroin, Cocaine     Social History     Tobacco Use   Smoking Status Every Day   Smokeless Tobacco Never       FAMILY HISTORY:  History reviewed  No pertinent family history      MEDICATIONS:    Current Facility-Administered Medications:   •  heparin (porcine) subcutaneous injection 5,000 Units, 5,000 Units, Subcutaneous, Q8H [address], 5,000 Units at 12/13/22 0828 **AND** [COMPLETED] Platelet count, , , Once, Chen Adkins MD  •  HYDROmorphone (DILAUDID) injection 0 5 mg, 0 5 mg, Intravenous, Q6H PRN, Chen Adkins MD  •  nicotine (NICODERM CQ) 14 mg/24hr TD 24 hr patch 1 patch, 1 patch, Transdermal, Daily, Chen Adkins MD  •  oxyCODONE (ROXICODONE) immediate release tablet 10 mg, 10 mg, Oral, Q6H PRN, Scar Pike MD  •  oxyCODONE (ROXICODONE) IR tablet 5 mg, 5 mg, Oral, Q6H PRN, Scar Pike MD  •  sodium bicarbonate 150 mEq in dextrose 5 % 1,000 mL infusion, 125 mL/hr, Intravenous, Continuous, ERNESTO Dodson  •  sodium chloride 0 9 % bolus 1,000 mL, 1,000 mL, Intravenous, Once, Marion Aleman PA-C    Current Outpatient Medications:   •  Buprenorphine HCl-Naloxone HCl (SUBOXONE SL), Place under the tongue, Disp: , Rfl:   •  traZODone (DESYREL) 50 mg tablet, Take 50 mg by mouth daily at bedtime, Disp: , Rfl:   •  QUEtiapine (SEROquel) 25 mg tablet, Take 25 mg by mouth 2 (two) times a day (Patient not taking: Reported on 12/12/2022), Disp: , Rfl:     REVIEW OF SYSTEMS:  Review of Systems   Constitutional: Negative  HENT: Negative  Respiratory: Negative  Gastrointestinal: Negative  Genitourinary: Positive for decreased urine volume  Musculoskeletal:        Left leg pain      A complete 10 point review of systems was performed and found to be negative unless otherwise noted above or in the HPI  PHYSICAL EXAM:  Current Weight: Weight - Scale: 92 7 kg (204 lb 5 9 oz)  First Weight: Weight - Scale: 92 6 kg (204 lb 2 3 oz)  Vitals:    12/13/22 0328 12/13/22 0620 12/13/22 0813 12/13/22 0825   BP: 138/76 125/63 138/71    BP Location: Right arm Left arm Right arm    Pulse: 75 74 83    Resp: 18 18 18    Temp:   97 7 °F (36 5 °C)    TempSrc:   Oral    SpO2: 100% 100% 99%    Weight:    92 7 kg (204 lb 5 9 oz)   Height:           Intake/Output Summary (Last 24 hours) at 12/13/2022 1105  Last data filed at 12/13/2022 1001  Gross per 24 hour   Intake 3120 ml   Output 700 ml   Net 2420 ml     Physical Exam  Constitutional:       General: He is not in acute distress  Appearance: Normal appearance  He is not toxic-appearing or diaphoretic  HENT:      Head: Normocephalic          Nose: Nose normal       Mouth/Throat:      Mouth: Mucous membranes are moist    Eyes:      General: No scleral icterus  Cardiovascular:      Rate and Rhythm: Normal rate and regular rhythm  Pulses: Normal pulses  Pulmonary:      Effort: Pulmonary effort is normal  No respiratory distress  Breath sounds: Normal breath sounds  No wheezing or rales  Abdominal:      General: Abdomen is flat  There is no distension  Palpations: Abdomen is soft  Tenderness: There is no abdominal tenderness  Musculoskeletal:      Cervical back: Neck supple  Right lower leg: No edema  Left lower leg: No edema  Comments: Bilateral pedal pulses palpable  Bilateral lower extremities warm to touch with normal cap refill  Soft muscle compartments of lower extremity bilaterally   Skin:     General: Skin is warm and dry  Capillary Refill: Capillary refill takes less than 2 seconds  Neurological:      General: No focal deficit present  Mental Status: He is alert and oriented to person, place, and time  Invasive Devices:      Lab Results:   Results from last 7 days   Lab Units 12/13/22  0614 12/12/22  1659 12/12/22  1208   WBC Thousand/uL 9 04  --  10 90*   HEMOGLOBIN g/dL 10 8*  --  12 7   HEMATOCRIT % 31 6*  --  37 4   PLATELETS Thousands/uL 279 285 305   POTASSIUM mmol/L 4 6  --  5 1   CHLORIDE mmol/L 98  --  94*   CO2 mmol/L 17*  --  20*   BUN mg/dL 96*  --  86*   CREATININE mg/dL 7 58*  --  6 46*   CALCIUM mg/dL 7 9*  --  8 3   ALK PHOS U/L 62  --  73   ALT U/L 463*  --  593*   AST U/L 1,121*  --  1,848*       I have personally reviewed the blood work as stated above and in my note  I have personally reviewed CT abdomen pelvis, lower extremity venous duplex study imaging studies  I have personally reviewed internal medicine note

## 2022-12-14 LAB
ALBUMIN SERPL BCP-MCNC: 2.6 G/DL (ref 3.5–5)
ALP SERPL-CCNC: 55 U/L (ref 46–116)
ALT SERPL W P-5'-P-CCNC: 368 U/L (ref 12–78)
ANION GAP SERPL CALCULATED.3IONS-SCNC: 16 MMOL/L (ref 4–13)
AST SERPL W P-5'-P-CCNC: 702 U/L (ref 5–45)
BILIRUB SERPL-MCNC: 0.35 MG/DL (ref 0.2–1)
BUN SERPL-MCNC: 106 MG/DL (ref 5–25)
CALCIUM ALBUM COR SERPL-MCNC: 9.1 MG/DL (ref 8.3–10.1)
CALCIUM SERPL-MCNC: 8 MG/DL (ref 8.3–10.1)
CHLORIDE SERPL-SCNC: 96 MMOL/L (ref 96–108)
CK MB SERPL-MCNC: 58.8 NG/ML (ref 0–5)
CK MB SERPL-MCNC: <1 % (ref 0–2.5)
CK SERPL-CCNC: ABNORMAL U/L (ref 39–308)
CO2 SERPL-SCNC: 24 MMOL/L (ref 21–32)
CREAT SERPL-MCNC: 8.44 MG/DL (ref 0.6–1.3)
ERYTHROCYTE [DISTWIDTH] IN BLOOD BY AUTOMATED COUNT: 13.8 % (ref 11.6–15.1)
GFR SERPL CREATININE-BSD FRML MDRD: 7 ML/MIN/1.73SQ M
GLUCOSE SERPL-MCNC: 128 MG/DL (ref 65–140)
HCT VFR BLD AUTO: 29.9 % (ref 36.5–49.3)
HGB BLD-MCNC: 10.3 G/DL (ref 12–17)
INR PPP: 1.06 (ref 0.84–1.19)
MCH RBC QN AUTO: 29.3 PG (ref 26.8–34.3)
MCHC RBC AUTO-ENTMCNC: 34.4 G/DL (ref 31.4–37.4)
MCV RBC AUTO: 85 FL (ref 82–98)
PLATELET # BLD AUTO: 259 THOUSANDS/UL (ref 149–390)
PMV BLD AUTO: 9.8 FL (ref 8.9–12.7)
POTASSIUM SERPL-SCNC: 3.7 MMOL/L (ref 3.5–5.3)
PROT SERPL-MCNC: 6 G/DL (ref 6.4–8.4)
PROTHROMBIN TIME: 13.9 SECONDS (ref 11.6–14.5)
RBC # BLD AUTO: 3.52 MILLION/UL (ref 3.88–5.62)
SODIUM SERPL-SCNC: 136 MMOL/L (ref 135–147)
WBC # BLD AUTO: 7.79 THOUSAND/UL (ref 4.31–10.16)

## 2022-12-14 RX ORDER — SODIUM CHLORIDE, SODIUM GLUCONATE, SODIUM ACETATE, POTASSIUM CHLORIDE, MAGNESIUM CHLORIDE, SODIUM PHOSPHATE, DIBASIC, AND POTASSIUM PHOSPHATE .53; .5; .37; .037; .03; .012; .00082 G/100ML; G/100ML; G/100ML; G/100ML; G/100ML; G/100ML; G/100ML
75 INJECTION, SOLUTION INTRAVENOUS CONTINUOUS
Status: DISCONTINUED | OUTPATIENT
Start: 2022-12-14 | End: 2022-12-20

## 2022-12-14 RX ADMIN — SODIUM CHLORIDE, SODIUM GLUCONATE, SODIUM ACETATE, POTASSIUM CHLORIDE, MAGNESIUM CHLORIDE, SODIUM PHOSPHATE, DIBASIC, AND POTASSIUM PHOSPHATE 125 ML/HR: .53; .5; .37; .037; .03; .012; .00082 INJECTION, SOLUTION INTRAVENOUS at 10:54

## 2022-12-14 RX ADMIN — SODIUM BICARBONATE 125 ML/HR: 84 INJECTION, SOLUTION INTRAVENOUS at 05:31

## 2022-12-14 RX ADMIN — SODIUM CHLORIDE, SODIUM GLUCONATE, SODIUM ACETATE, POTASSIUM CHLORIDE, MAGNESIUM CHLORIDE, SODIUM PHOSPHATE, DIBASIC, AND POTASSIUM PHOSPHATE 125 ML/HR: .53; .5; .37; .037; .03; .012; .00082 INJECTION, SOLUTION INTRAVENOUS at 18:57

## 2022-12-14 RX ADMIN — HEPARIN SODIUM 5000 UNITS: 5000 INJECTION INTRAVENOUS; SUBCUTANEOUS at 13:56

## 2022-12-14 RX ADMIN — HEPARIN SODIUM 5000 UNITS: 5000 INJECTION INTRAVENOUS; SUBCUTANEOUS at 05:31

## 2022-12-14 RX ADMIN — HEPARIN SODIUM 5000 UNITS: 5000 INJECTION INTRAVENOUS; SUBCUTANEOUS at 21:46

## 2022-12-14 NOTE — PROGRESS NOTES
NEPHROLOGY PROGRESS NOTE   Grover Martino 33 y o  male MRN: [id number]  Unit/Bed#: [address] 203-01 Encounter: [id number]      ASSESSMENT & PLAN:  #1 severe acute kidney injury (POA) suspected secondary to ATN in the setting of rhabdomyolysis  Prior creatinine 0 88 on 11/2022 per Care Everywhere  Patient admitted with a creatinine of 6 46 on 12/12 kidney function unfortunately not improving with creatinine up to 8  44  Acidosis improved on bicarb drip  No urgent indication for dialysis at this moment  Patient has signed consent for dialysis yesterday if needed  Continue with IV fluids but change to Isolyte at 125 cc/h  Monitor in and outs, follow daily labs, follow daily CK levels    #2 rhabdomyolysis, CK trending down from 74,000 on admission down to 30,000 today  Continue with IV fluids  Follow daily CK levels    3 anion gap metabolic acidosis in the setting of acute kidney injury, improving with bicarb drip  Changed to Isolyte as above    #4 hyponatremia, improving with IV fluids    5 abnormal LFTs, suspected secondary to rhabdo, will defer to primary team    6 substance abuse, patient with urine tox screen positive for cocaine on admission      SUBJECTIVE:  Patient seen and examined, states he is feeling better, reported his left leg pain is improving, denies any chest pain, no shortness of breath, no nausea, no vomiting, no abdominal pain    Reports he hurt himself after falling and possible passing out but does not recall any other prior events    OBJECTIVE:  Current Weight: Weight - Scale: 91 8 kg (202 lb 6 1 oz)  Vitals:    12/14/22 0843   BP: 132/74   Pulse: 78   Resp: 18   Temp:    SpO2: 99%       Intake/Output Summary (Last 24 hours) at 12/14/2022 1031  Last data filed at 12/14/2022 3717  Gross per 24 hour   Intake 3215 92 ml   Output 1525 ml   Net 1690 92 ml       General: conscious, cooperative, in not acute distress  Eyes: conjunctivae pink, anicteric sclerae  ENT: lips and mucous membranes moist, on room air  Neck: supple, no JVD  Chest: clear breath sounds bilateral, no crackles, ronchus or wheezings  CVS: distinct S1 & S2, normal rate, regular rhythm  Abdomen: soft, non-tender, non-distended, normoactive bowel sounds  Extremities: no significant edema of both legs  Skin: no rash, excoriation over the left side of the forehead  Neuro: awake, alert, oriented        Medications:    Current Facility-Administered Medications:   •  heparin (porcine) subcutaneous injection 5,000 Units, 5,000 Units, Subcutaneous, Q8H [address], 5,000 Units at 12/14/22 0531 **AND** [COMPLETED] Platelet count, , , Once, Jalen Perkins MD  •  HYDROmorphone (DILAUDID) injection 0 5 mg, 0 5 mg, Intravenous, Q6H PRN, Jalen Perkins MD  •  nicotine (NICODERM CQ) 14 mg/24hr TD 24 hr patch 1 patch, 1 patch, Transdermal, Daily, Jalen Perkins MD  •  oxyCODONE (ROXICODONE) immediate release tablet 10 mg, 10 mg, Oral, Q6H PRN, Jalen Perkins MD  •  oxyCODONE (ROXICODONE) IR tablet 5 mg, 5 mg, Oral, Q6H PRN, Jalen Perkins MD  •  sodium bicarbonate 150 mEq in dextrose 5 % 1,000 mL infusion, 125 mL/hr, Intravenous, Continuous, ERNESTO Downs, Last Rate: 125 mL/hr at 12/14/22 0531, 125 mL/hr at 12/14/22 0531  •  sodium chloride 0 9 % bolus 1,000 mL, 1,000 mL, Intravenous, Once, Pily Sunshine PA-C    Invasive Devices:        Lab Results:   Results from last 7 days   Lab Units 12/14/22  0528 12/13/22  0614 12/12/22  1659 12/12/22  1208   WBC Thousand/uL 7 79 9 04  --  10 90*   HEMOGLOBIN g/dL 10 3* 10 8*  --  12 7   HEMATOCRIT % 29 9* 31 6*  --  37 4   PLATELETS Thousands/uL 259 279 285 305   SODIUM mmol/L 136 133*  --  128*   POTASSIUM mmol/L 3 7 4 6  --  5 1   CHLORIDE mmol/L 96 98  --  94*   CO2 mmol/L 24 17*  --  20*   BUN mg/dL 106* 96*  --  86*   CREATININE mg/dL 8 44* 7 58*  --  6 46*   CALCIUM mg/dL 8 0* 7 9*  --  8 3   ALK PHOS U/L 55 62  --  73   ALT U/L 368* 463*  --  593*   AST U/L 702* 1,121*  --  1,848*       Previous work up:  See previous notes      Portions of the record may have been created with voice recognition software  Occasional wrong word or "sound a like" substitutions may have occurred due to the inherent limitations of voice recognition software  Read the chart carefully and recognize, using context, where substitutions have occurred  If you have any questions, please contact the dictating provider

## 2022-12-14 NOTE — PROGRESS NOTES
[facility]  Progress Note - Lonnie Loera 1990, 33 y o  male MRN: [id number]  Unit/Bed#: [address] 203[address]01 Encounter: [id number]  Primary Care Provider: Larry Hernandez PA-C   Date and time admitted to hospital: 12/12/2022 11:12 AM    * Rhabdomyolysis  Assessment & Plan  · Patient presented for LLE pain and tingling especially in the gluteal region  States he "woke up in pain" but doesn't recall inciting events  Does have facial scrapes so cannot exclude trauma   · CK > 70,000 improved to 30,000 on IVF  · Acidosis improved on bicarb drip, switched to isolate today  · Kidney function continues to worsen today but he remains hemodynamically stable without signs of uremia  · Per nephrology, may require renal replacement therapy in the next 24 to 48 hours  · Repeat BMP in a m  Anemia  Assessment & Plan  · Not many records but baseline appears to be around 12  · Currently 10 3  · Likely dilutional in the setting of aggressive fluids repletion due to rhabdo   · Monitor closely   · No clinical signs of bleeding       Elevated LFTs  Assessment & Plan  · Likely in setting of rhabdo  · He does have a history of untreated hep c secondary to IVDU   · Improving during hospitalization, recommend outpatient GI follow-up    Leg pain  Assessment & Plan  · Doppler ultrasound negative for DVT   · CT imaging showing subcu edema, swelling with suspicions of post trauma  · Suspect likely due to myalgias in setting of rhabdo, low suspicion for infection, hold antibiotics  · Notes improvement today    Substance abuse ([facility] [address] )  Assessment & Plan  · History of IV drug abuse, UDS positive for cocaine  · Previously on Suboxone, has been off it for several days, will hold off at this time  · Continue to monitor for withdrawals    SUSANA (acute kidney injury) ([facility] [address] )  Assessment & Plan  · Baseline creatinine of 1  · Creatinine 6 46 at time of admission, worsened today to 8 44  · Elevated in setting of rhabdo  · Nephrology following  · Continue IV fluids, may require dialysis in the next 24 to 48 hours, consent obtained by nephrology        VTE Pharmacologic Prophylaxis:   Moderate Risk (Score 3-4) - Pharmacological DVT Prophylaxis Ordered: heparin  Patient Centered Rounds: I performed bedside rounds with nursing staff today  Discussions with Specialists or Other Care Team Provider: Nephrology    Education and Discussions with Family / Patient: Attempted to update  (mother) via phone  Unable to contact  Time Spent for Care: 30 minutes  More than 50% of total time spent on counseling and coordination of care as described above  Current Length of Stay: 2 day(s)  Current Patient Status: Inpatient   Certification Statement: The patient will continue to require additional inpatient hospital stay due to Rhabdomyolysis, SUSANA requiring IV fluids and frequent monitoring  Discharge Plan: Anticipate discharge in 48-72 hrs to home  Code Status: Level 1 - Full Code    Subjective:   Patient reports he is feeling better today  Still having some pain in his foot but notes improvement since yesterday  Eating and drinking well  Objective:     Vitals:   Temp (24hrs), Av 4 °F (36 9 °C), Min:98 3 °F (36 8 °C), Max:98 4 °F (36 9 °C)    Temp:  [98 3 °F (36 8 °C)-98 4 °F (36 9 °C)] 98 3 °F (36 8 °C)  HR:  [76-83] 78  Resp:  [18] 18  BP: (125-147)/(66-86) 132/74  SpO2:  [96 %-99 %] 99 %  Body mass index is 30 77 kg/m²  Input and Output Summary (last 24 hours): Intake/Output Summary (Last 24 hours) at 2022 1147  Last data filed at 2022 0837  Gross per 24 hour   Intake 2163 ml   Output 1050 ml   Net 1113 ml       Physical Exam:   Physical Exam  Vitals reviewed  Constitutional:       General: He is not in acute distress  HENT:      Head: Normocephalic and atraumatic  Eyes:      General: No scleral icterus       Conjunctiva/sclera: Conjunctivae normal    Cardiovascular:      Rate and Rhythm: Normal rate and regular rhythm  Heart sounds: No murmur heard  Pulmonary:      Effort: Pulmonary effort is normal  No respiratory distress  Breath sounds: Normal breath sounds  Abdominal:      General: Bowel sounds are normal  There is no distension  Palpations: Abdomen is soft  Tenderness: There is no abdominal tenderness  Musculoskeletal:      Cervical back: Neck supple  Right lower leg: No edema  Left lower leg: No edema  Skin:     General: Skin is warm and dry  Neurological:      Mental Status: He is alert and oriented to person, place, and time  Psychiatric:         Mood and Affect: Mood normal          Behavior: Behavior normal           Additional Data:     Labs:  Results from last 7 days   Lab Units 12/14/22  0528 12/12/22  1659 12/12/22  1208   WBC Thousand/uL 7 79   < > 10 90*   HEMOGLOBIN g/dL 10 3*   < > 12 7   HEMATOCRIT % 29 9*   < > 37 4   PLATELETS Thousands/uL 259   < > 305   NEUTROS PCT %  --   --  80*   LYMPHS PCT %  --   --  12*   MONOS PCT %  --   --  7   EOS PCT %  --   --  0    < > = values in this interval not displayed  Results from last 7 days   Lab Units 12/14/22  0528   SODIUM mmol/L 136   POTASSIUM mmol/L 3 7   CHLORIDE mmol/L 96   CO2 mmol/L 24   BUN mg/dL 106*   CREATININE mg/dL 8 44*   ANION GAP mmol/L 16*   CALCIUM mg/dL 8 0*   ALBUMIN g/dL 2 6*   TOTAL BILIRUBIN mg/dL 0 35   ALK PHOS U/L 55   ALT U/L 368*   AST U/L 702*   GLUCOSE RANDOM mg/dL 128     Results from last 7 days   Lab Units 12/14/22  0528   INR  1 06                   Lines/Drains:  Invasive Devices     Peripheral Intravenous Line  Duration           Peripheral IV 12/12/22 Right Antecubital 1 day                      Imaging: No pertinent imaging reviewed      Recent Cultures (last 7 days):         Last 24 Hours Medication List:   Current Facility-Administered Medications   Medication Dose Route Frequency Provider Last Rate   • heparin (porcine)  5,000 Units Subcutaneous Q8H [address] Kojo Shipman MD     • HYDROmorphone  0 5 mg Intravenous Q6H PRN Kojo Shipman MD     • multi-electrolyte  125 mL/hr Intravenous Continuous Cathdickosn Hopkins  mL/hr (12/14/22 1054)   • nicotine  1 patch Transdermal Daily Kojo Shipman MD     • oxyCODONE  10 mg Oral Q6H PRN Kojo Shipman MD     • oxyCODONE  5 mg Oral Q6H PRN Romain Taylor MD     • sodium chloride  1,000 mL Intravenous Once Lizet Regan PA-C          Today, Patient Was Seen By: Maria Luz Scott PA-C    **Please Note: This note may have been constructed using a voice recognition system  **

## 2022-12-14 NOTE — PLAN OF CARE
Problem: PAIN - ADULT  Goal: Verbalizes/displays adequate comfort level or baseline comfort level  Description: Interventions:  - Encourage patient to monitor pain and request assistance  - Assess pain using appropriate pain scale  - Administer analgesics based on type and severity of pain and evaluate response  - Implement non-pharmacological measures as appropriate and evaluate response  - Consider cultural and social influences on pain and pain management  - Notify physician/advanced practitioner if interventions unsuccessful or patient reports new pain  Outcome: Progressing     Problem: INFECTION - ADULT  Goal: Absence or prevention of progression during hospitalization  Description: INTERVENTIONS:  - Assess and monitor for signs and symptoms of infection  - Monitor lab/diagnostic results  - Monitor all insertion sites, i e  indwelling lines, tubes, and drains  - Monitor endotracheal if appropriate and nasal secretions for changes in amount and color  - [place] appropriate cooling/warming therapies per order  - Administer medications as ordered  - Instruct and encourage patient and family to use good hand hygiene technique  - Identify and instruct in appropriate isolation precautions for identified infection/condition  Outcome: Progressing     Problem: SAFETY ADULT  Goal: Patient will remain free of falls  Description: INTERVENTIONS:  - Educate patient/family on patient safety including physical limitations  - Instruct patient to call for assistance with activity   - Consult OT/PT to assist with strengthening/mobility   - Keep Call bell within reach  - Keep bed low and locked with side rails adjusted as appropriate  - Keep care items and personal belongings within reach  - Initiate and maintain comfort rounds  - Apply yellow socks and bracelet for high fall risk patients  - Consider moving patient to room near nurses station  Outcome: Progressing  Goal: Maintain or return to baseline ADL function  Description: INTERVENTIONS:  -  Assess patient's ability to carry out ADLs; assess patient's baseline for ADL function and identify physical deficits which impact ability to perform ADLs (bathing, care of mouth/teeth, toileting, grooming, dressing, etc )  - Assess/evaluate cause of self-care deficits   - Assess range of motion  - Assess patient's mobility; develop plan if impaired  - Assess patient's need for assistive devices and provide as appropriate  - Encourage maximum independence but intervene and supervise when necessary  - Involve family in performance of ADLs  - Assess for home care needs following discharge   - Consider OT consult to assist with ADL evaluation and planning for discharge  - Provide patient education as appropriate  Outcome: Progressing  Goal: Maintains/Returns to pre admission functional level  Description: INTERVENTIONS:  - Perform BMAT or MOVE assessment daily    - Set and communicate daily mobility goal to care team and patient/family/caregiver     - Collaborate with rehabilitation services on mobility goals if consulted  - Out of bed for meals 3 times a day  - Out of bed for toileting  - Record patient progress and toleration of activity level   Outcome: Progressing     Problem: DISCHARGE PLANNING  Goal: Discharge to home or other facility with appropriate resources  Description: INTERVENTIONS:  - Identify barriers to discharge w/patient and caregiver  - Arrange for needed discharge resources and transportation as appropriate  - Identify discharge learning needs (meds, wound care, etc )  - Arrange for interpretive services to assist at discharge as needed  - Refer to Case Management Department for coordinating discharge planning if the patient needs post-hospital services based on physician/advanced practitioner order or complex needs related to functional status, cognitive ability, or social support system  Outcome: Progressing     Problem: Knowledge Deficit  Goal: Patient/family/caregiver demonstrates understanding of disease process, treatment plan, medications, and discharge instructions  Description: Complete learning assessment and assess knowledge base    Interventions:  - Provide teaching at level of understanding  - Provide teaching via preferred learning methods  Outcome: Progressing     Problem: METABOLIC, FLUID AND ELECTROLYTES - ADULT  Goal: Electrolytes maintained within normal limits  Description: INTERVENTIONS:  - Monitor labs and assess patient for signs and symptoms of electrolyte imbalances  - Administer electrolyte replacement as ordered  - Monitor response to electrolyte replacements, including repeat lab results as appropriate  - Instruct patient on fluid and nutrition as appropriate  Outcome: Progressing  Goal: Fluid balance maintained  Description: INTERVENTIONS:  - Monitor labs   - Monitor I/O and WT  - Instruct patient on fluid and nutrition as appropriate  - Assess for signs & symptoms of volume excess or deficit  Outcome: Progressing     Problem: MOBILITY - ADULT  Goal: Maintain or return to baseline ADL function  Description: INTERVENTIONS:  -  Assess patient's ability to carry out ADLs; assess patient's baseline for ADL function and identify physical deficits which impact ability to perform ADLs (bathing, care of mouth/teeth, toileting, grooming, dressing, etc )  - Assess/evaluate cause of self-care deficits   - Assess range of motion  - Assess patient's mobility; develop plan if impaired  - Assess patient's need for assistive devices and provide as appropriate  - Encourage maximum independence but intervene and supervise when necessary  - Involve family in performance of ADLs  - Assess for home care needs following discharge   - Consider OT consult to assist with ADL evaluation and planning for discharge  - Provide patient education as appropriate  Outcome: Progressing  Goal: Maintains/Returns to pre admission functional level  Description: INTERVENTIONS:  - Perform BMAT or MOVE assessment daily    - Set and communicate daily mobility goal to care team and patient/family/caregiver     - Collaborate with rehabilitation services on mobility goals if consulted  - Out of bed for meals 3 times a day  - Out of bed for toileting  - Record patient progress and toleration of activity level   Outcome: Progressing

## 2022-12-14 NOTE — ASSESSMENT & PLAN NOTE
· Doppler ultrasound negative for DVT   · CT imaging showing subcu edema, swelling with suspicions of post trauma  · Suspect likely due to myalgias in setting of rhabdo, low suspicion for infection, hold antibiotics  · Notes improvement today

## 2022-12-14 NOTE — ASSESSMENT & PLAN NOTE
· Baseline creatinine of 1  · Creatinine 6 46 at time of admission, worsened today to 8 44  · Elevated in setting of rhabdo  · Nephrology following  · Continue IV fluids, may require dialysis in the next 24 to 48 hours, consent obtained by nephrology

## 2022-12-14 NOTE — ASSESSMENT & PLAN NOTE
· Not many records but baseline appears to be around 12  · Currently 10 3  · Likely dilutional in the setting of aggressive fluids repletion due to rhabdo   · Monitor closely   · No clinical signs of bleeding

## 2022-12-14 NOTE — ASSESSMENT & PLAN NOTE
· Patient presented for LLE pain and tingling especially in the gluteal region  States he "woke up in pain" but doesn't recall inciting events  Does have facial scrapes so cannot exclude trauma   · CK > 70,000 improved to 30,000 on IVF  · Acidosis improved on bicarb drip, switched to isolate today  · Kidney function continues to worsen today but he remains hemodynamically stable without signs of uremia  · Per nephrology, may require renal replacement therapy in the next 24 to 48 hours  · Repeat BMP in a m

## 2022-12-14 NOTE — ASSESSMENT & PLAN NOTE
· Likely in setting of rhabdo  · He does have a history of untreated hep c secondary to IVDU   · Improving during hospitalization, recommend outpatient GI follow-up

## 2022-12-14 NOTE — PLAN OF CARE
Problem: PAIN - ADULT  Goal: Verbalizes/displays adequate comfort level or baseline comfort level  Description: Interventions:  - Encourage patient to monitor pain and request assistance  - Assess pain using appropriate pain scale  - Administer analgesics based on type and severity of pain and evaluate response  - Implement non-pharmacological measures as appropriate and evaluate response  - Consider cultural and social influences on pain and pain management  - Notify physician/advanced practitioner if interventions unsuccessful or patient reports new pain  Outcome: Progressing     Problem: INFECTION - ADULT  Goal: Absence or prevention of progression during hospitalization  Description: INTERVENTIONS:  - Assess and monitor for signs and symptoms of infection  - Monitor lab/diagnostic results  - Monitor all insertion sites, i e  indwelling lines, tubes, and drains  - Monitor endotracheal if appropriate and nasal secretions for changes in amount and color  - [place] appropriate cooling/warming therapies per order  - Administer medications as ordered  - Instruct and encourage patient and family to use good hand hygiene technique  - Identify and instruct in appropriate isolation precautions for identified infection/condition  Outcome: Progressing     Problem: SAFETY ADULT  Goal: Patient will remain free of falls  Description: INTERVENTIONS:  - Educate patient/family on patient safety including physical limitations  - Instruct patient to call for assistance with activity   - Consult OT/PT to assist with strengthening/mobility   - Keep Call bell within reach  - Keep bed low and locked with side rails adjusted as appropriate  - Keep care items and personal belongings within reach  - Initiate and maintain comfort rounds  - Apply yellow socks and bracelet for high fall risk patients  - Consider moving patient to room near nurses station  Outcome: Progressing  Goal: Maintain or return to baseline ADL function  Description: INTERVENTIONS:  -  Assess patient's ability to carry out ADLs; assess patient's baseline for ADL function and identify physical deficits which impact ability to perform ADLs (bathing, care of mouth/teeth, toileting, grooming, dressing, etc )  - Assess/evaluate cause of self-care deficits   - Assess range of motion  - Assess patient's mobility; develop plan if impaired  - Assess patient's need for assistive devices and provide as appropriate  - Encourage maximum independence but intervene and supervise when necessary  - Involve family in performance of ADLs  - Assess for home care needs following discharge   - Consider OT consult to assist with ADL evaluation and planning for discharge  - Provide patient education as appropriate  Outcome: Progressing  Goal: Maintains/Returns to pre admission functional level  Description: INTERVENTIONS:  - Perform BMAT or MOVE assessment daily    - Set and communicate daily mobility goal to care team and patient/family/caregiver     - Collaborate with rehabilitation services on mobility goals if consulted  - Out of bed for meals 3 times a day  - Out of bed for toileting  - Record patient progress and toleration of activity level   Outcome: Progressing     Problem: DISCHARGE PLANNING  Goal: Discharge to home or other facility with appropriate resources  Description: INTERVENTIONS:  - Identify barriers to discharge w/patient and caregiver  - Arrange for needed discharge resources and transportation as appropriate  - Identify discharge learning needs (meds, wound care, etc )  - Arrange for interpretive services to assist at discharge as needed  - Refer to Case Management Department for coordinating discharge planning if the patient needs post-hospital services based on physician/advanced practitioner order or complex needs related to functional status, cognitive ability, or social support system  Outcome: Progressing     Problem: Knowledge Deficit  Goal: Patient/family/caregiver demonstrates understanding of disease process, treatment plan, medications, and discharge instructions  Description: Complete learning assessment and assess knowledge base    Interventions:  - Provide teaching at level of understanding  - Provide teaching via preferred learning methods  Outcome: Progressing     Problem: METABOLIC, FLUID AND ELECTROLYTES - ADULT  Goal: Electrolytes maintained within normal limits  Description: INTERVENTIONS:  - Monitor labs and assess patient for signs and symptoms of electrolyte imbalances  - Administer electrolyte replacement as ordered  - Monitor response to electrolyte replacements, including repeat lab results as appropriate  - Instruct patient on fluid and nutrition as appropriate  Outcome: Progressing  Goal: Fluid balance maintained  Description: INTERVENTIONS:  - Monitor labs   - Monitor I/O and WT  - Instruct patient on fluid and nutrition as appropriate  - Assess for signs & symptoms of volume excess or deficit  Outcome: Progressing     Problem: MOBILITY - ADULT  Goal: Maintain or return to baseline ADL function  Description: INTERVENTIONS:  -  Assess patient's ability to carry out ADLs; assess patient's baseline for ADL function and identify physical deficits which impact ability to perform ADLs (bathing, care of mouth/teeth, toileting, grooming, dressing, etc )  - Assess/evaluate cause of self-care deficits   - Assess range of motion  - Assess patient's mobility; develop plan if impaired  - Assess patient's need for assistive devices and provide as appropriate  - Encourage maximum independence but intervene and supervise when necessary  - Involve family in performance of ADLs  - Assess for home care needs following discharge   - Consider OT consult to assist with ADL evaluation and planning for discharge  - Provide patient education as appropriate  Outcome: Progressing  Goal: Maintains/Returns to pre admission functional level  Description: INTERVENTIONS:  - Perform BMAT or MOVE assessment daily    - Set and communicate daily mobility goal to care team and patient/family/caregiver     - Collaborate with rehabilitation services on mobility goals if consulted  - Out of bed for meals 3 times a day  - Out of bed for toileting  - Record patient progress and toleration of activity level   Outcome: Progressing

## 2022-12-15 LAB
ANION GAP SERPL CALCULATED.3IONS-SCNC: 12 MMOL/L (ref 4–13)
BUN SERPL-MCNC: 104 MG/DL (ref 5–25)
CALCIUM SERPL-MCNC: 8.6 MG/DL (ref 8.3–10.1)
CHLORIDE SERPL-SCNC: 99 MMOL/L (ref 96–108)
CK MB SERPL-MCNC: 20 NG/ML (ref 0–5)
CK MB SERPL-MCNC: <1 % (ref 0–2.5)
CK SERPL-CCNC: ABNORMAL U/L (ref 39–308)
CO2 SERPL-SCNC: 26 MMOL/L (ref 21–32)
CREAT SERPL-MCNC: 8.31 MG/DL (ref 0.6–1.3)
GFR SERPL CREATININE-BSD FRML MDRD: 7 ML/MIN/1.73SQ M
GLUCOSE SERPL-MCNC: 102 MG/DL (ref 65–140)
POTASSIUM SERPL-SCNC: 4 MMOL/L (ref 3.5–5.3)
SODIUM SERPL-SCNC: 137 MMOL/L (ref 135–147)

## 2022-12-15 RX ORDER — LANOLIN ALCOHOL/MO/W.PET/CERES
3 CREAM (GRAM) TOPICAL
Status: DISCONTINUED | OUTPATIENT
Start: 2022-12-15 | End: 2022-12-21 | Stop reason: HOSPADM

## 2022-12-15 RX ADMIN — Medication 3 MG: at 22:02

## 2022-12-15 RX ADMIN — SODIUM CHLORIDE, SODIUM GLUCONATE, SODIUM ACETATE, POTASSIUM CHLORIDE, MAGNESIUM CHLORIDE, SODIUM PHOSPHATE, DIBASIC, AND POTASSIUM PHOSPHATE 125 ML/HR: .53; .5; .37; .037; .03; .012; .00082 INJECTION, SOLUTION INTRAVENOUS at 11:17

## 2022-12-15 RX ADMIN — HEPARIN SODIUM 5000 UNITS: 5000 INJECTION INTRAVENOUS; SUBCUTANEOUS at 22:02

## 2022-12-15 RX ADMIN — HEPARIN SODIUM 5000 UNITS: 5000 INJECTION INTRAVENOUS; SUBCUTANEOUS at 14:52

## 2022-12-15 RX ADMIN — Medication 3 MG: at 03:01

## 2022-12-15 RX ADMIN — HEPARIN SODIUM 5000 UNITS: 5000 INJECTION INTRAVENOUS; SUBCUTANEOUS at 05:28

## 2022-12-15 RX ADMIN — SODIUM CHLORIDE, SODIUM GLUCONATE, SODIUM ACETATE, POTASSIUM CHLORIDE, MAGNESIUM CHLORIDE, SODIUM PHOSPHATE, DIBASIC, AND POTASSIUM PHOSPHATE 125 ML/HR: .53; .5; .37; .037; .03; .012; .00082 INJECTION, SOLUTION INTRAVENOUS at 18:14

## 2022-12-15 RX ADMIN — SODIUM CHLORIDE, SODIUM GLUCONATE, SODIUM ACETATE, POTASSIUM CHLORIDE, MAGNESIUM CHLORIDE, SODIUM PHOSPHATE, DIBASIC, AND POTASSIUM PHOSPHATE 125 ML/HR: .53; .5; .37; .037; .03; .012; .00082 INJECTION, SOLUTION INTRAVENOUS at 02:48

## 2022-12-15 NOTE — PROGRESS NOTES
NEPHROLOGY PROGRESS NOTE   Estephania Cortez 27 y o  male MRN: [id number]  Unit/Bed#: [facility] [id number] Encounter: [id number]      ASSESSMENT & PLAN:  #1 severe acute kidney injury (POA) suspected secondary to ATN in the setting of rhabdomyolysis  Prior creatinine 0 88 on 11/2022 per Care Everywhere  Patient admitted with a creatinine of 6 46 on 12/12  Kidney function seems to plateau at 8 4 on 81/74, creatinine down to 8 31 today  Volume status acceptable  Continue with Isolyte at 125 cc/h  Monitor in and outs, follow daily labs, follow daily CK levels  Patient is not stable to be discharged yet  No urgent indication for dialysis at this moment    #2 rhabdomyolysis, CK trending down from 74,000 on admission down to 30,000 yesterday, CK level this morning in process  Continue with IV fluids  Follow daily CK levels    3 anion gap metabolic acidosis in the setting of acute kidney injury, resolved with bicarb drip    Continue with Isolyte for now    #4 hyponatremia, resolved with IV fluids    5 abnormal LFTs, suspected secondary to rhabdo, will defer to primary team    6 substance abuse, patient with urine tox screen positive for cocaine on admission      SUBJECTIVE:  Patient seen and examined, denies significant complaints, no chest pain, no shortness of breath, no abdominal pain, no nausea, no vomiting reports is urinating a lot, urine output recorded only 1 1 L reports sometimes he was not urinating in the urinal tender    OBJECTIVE:  Current Weight: Weight - Scale: 91 8 kg (202 lb 6 1 oz)  Vitals:    12/15/22 0743   BP: 148/86   Pulse: 69   Resp: 20   Temp: 98 1 °F (36 7 °C)   SpO2: 99%       Intake/Output Summary (Last 24 hours) at 12/15/2022 0935  Last data filed at 12/15/2022 0248  Gross per 24 hour   Intake --   Output 1150 ml   Net -1150 ml       General: conscious, cooperative, in not acute distress  Eyes: conjunctivae pink, anicteric sclerae  ENT: lips and mucous membranes moist, on room air  Neck: supple, no JVD  Chest: clear breath sounds bilateral, no crackles, ronchus or wheezings  CVS: distinct S1 & S2, normal rate, regular rhythm  Abdomen: soft, non-tender, non-distended, normoactive bowel sounds  Extremities: no significant edema of both legs  Skin: no rash  Neuro: awake, alert, oriented        Medications:    Current Facility-Administered Medications:   •  heparin (porcine) subcutaneous injection 5,000 Units, 5,000 Units, Subcutaneous, Q8H [facility], 5,000 Units at 12/15/22 0528 **AND** [COMPLETED] Platelet count, , , Once, Ronn Ortega MD  •  HYDROmorphone (DILAUDID) injection 0 5 mg, 0 5 mg, Intravenous, Q6H PRN, Ronn Ortega MD  •  melatonin tablet 3 mg, 3 mg, Oral, HS, Patrice Calixto PA-C, 3 mg at 12/15/22 0301  •  multi-electrolyte (PLASMALYTE-A/ISOLYTE-S PH 7 4) IV solution, 125 mL/hr, Intravenous, Continuous, Lalo Garzon MD, Last Rate: 125 mL/hr at 12/15/22 0248, 125 mL/hr at 12/15/22 0248  •  nicotine (NICODERM CQ) 14 mg/24hr TD 24 hr patch 1 patch, 1 patch, Transdermal, Daily, Ronn Ortega MD  •  oxyCODONE (ROXICODONE) immediate release tablet 10 mg, 10 mg, Oral, Q6H PRN, Ronn Ortega MD  •  oxyCODONE (ROXICODONE) IR tablet 5 mg, 5 mg, Oral, Q6H PRN, Ronn Ortega MD  •  sodium chloride 0 9 % bolus 1,000 mL, 1,000 mL, Intravenous, Once, Read NAKIA Almaguer    Invasive Devices:        Lab Results:   Results from last 7 days   Lab Units 12/15/22  0529 12/14/22  0528 12/13/22  0614 12/12/22  1659 12/12/22  1208   WBC Thousand/uL  --  7 79 9 04  --  10 90*   HEMOGLOBIN g/dL  --  10 3* 10 8*  --  12 7   HEMATOCRIT %  --  29 9* 31 6*  --  37 4   PLATELETS Thousands/uL  --  259 279 285 305   SODIUM mmol/L 137 136 133*  --  128*   POTASSIUM mmol/L 4 0 3 7 4 6  --  5 1   CHLORIDE mmol/L 99 96 98  --  94*   CO2 mmol/L 26 24 17*  --  20*   BUN mg/dL 104* 106* 96*  --  86*   CREATININE mg/dL 8 31* 8 44* 7 58*  --  6 46*   CALCIUM mg/dL 8 6 8 0* 7 9*  --  8 3   ALK PHOS U/L  --  55 62  --  73   ALT U/L --  368* 463*  --  593*   AST U/L  --  702* 1,121*  --  1,848*       Previous work up:  CK level on 12/15 in process  CK level on admission on 12/12 75,000      Portions of the record may have been created with voice recognition software  Occasional wrong word or "sound a like" substitutions may have occurred due to the inherent limitations of voice recognition software  Read the chart carefully and recognize, using context, where substitutions have occurred  If you have any questions, please contact the dictating provider

## 2022-12-15 NOTE — ASSESSMENT & PLAN NOTE
76-year-old male presented to institution due to left lower extremity pain after he woke up from pain as a result of " overdoing it" in the setting of drug use  · CK levels downtrending  · Acidosis improved on bicarbonate drip  Continues to remain on IV fluids  · Management per nephrology  Not stable for discharge yet  · Awaiting renal recovery        Recent Labs     12/13/22  1335 12/14/22  0528 12/15/22  0529   CKTOTAL 48,610* 30,440* 19,980*

## 2022-12-15 NOTE — PLAN OF CARE
Problem: PAIN - ADULT  Goal: Verbalizes/displays adequate comfort level or baseline comfort level  Description: Interventions:  - Encourage patient to monitor pain and request assistance  - Assess pain using appropriate pain scale  - Administer analgesics based on type and severity of pain and evaluate response  - Implement non-pharmacological measures as appropriate and evaluate response  - Consider cultural and social influences on pain and pain management  - Notify physician/advanced practitioner if interventions unsuccessful or patient reports new pain  Outcome: Progressing     Problem: INFECTION - ADULT  Goal: Absence or prevention of progression during hospitalization  Description: INTERVENTIONS:  - Assess and monitor for signs and symptoms of infection  - Monitor lab/diagnostic results  - Monitor all insertion sites, i e  indwelling lines, tubes, and drains  - Monitor endotracheal if appropriate and nasal secretions for changes in amount and color  - [place] appropriate cooling/warming therapies per order  - Administer medications as ordered  - Instruct and encourage patient and family to use good hand hygiene technique  - Identify and instruct in appropriate isolation precautions for identified infection/condition  Outcome: Progressing     Problem: SAFETY ADULT  Goal: Patient will remain free of falls  Description: INTERVENTIONS:  - Educate patient/family on patient safety including physical limitations  - Instruct patient to call for assistance with activity   - Consult OT/PT to assist with strengthening/mobility   - Keep Call bell within reach  - Keep bed low and locked with side rails adjusted as appropriate  - Keep care items and personal belongings within reach  - Initiate and maintain comfort rounds  - Apply yellow socks and bracelet for high fall risk patients  - Consider moving patient to room near nurses station  Outcome: Progressing  Goal: Maintain or return to baseline ADL function  Description: INTERVENTIONS:  -  Assess patient's ability to carry out ADLs; assess patient's baseline for ADL function and identify physical deficits which impact ability to perform ADLs (bathing, care of mouth/teeth, toileting, grooming, dressing, etc )  - Assess/evaluate cause of self-care deficits   - Assess range of motion  - Assess patient's mobility; develop plan if impaired  - Assess patient's need for assistive devices and provide as appropriate  - Encourage maximum independence but intervene and supervise when necessary  - Involve family in performance of ADLs  - Assess for home care needs following discharge   - Consider OT consult to assist with ADL evaluation and planning for discharge  - Provide patient education as appropriate  Outcome: Progressing  Goal: Maintains/Returns to pre admission functional level  Description: INTERVENTIONS:  - Perform BMAT or MOVE assessment daily    - Set and communicate daily mobility goal to care team and patient/family/caregiver     - Collaborate with rehabilitation services on mobility goals if consulted  - Out of bed for meals 3 times a day  - Out of bed for toileting  - Record patient progress and toleration of activity level   Outcome: Progressing     Problem: DISCHARGE PLANNING  Goal: Discharge to home or other facility with appropriate resources  Description: INTERVENTIONS:  - Identify barriers to discharge w/patient and caregiver  - Arrange for needed discharge resources and transportation as appropriate  - Identify discharge learning needs (meds, wound care, etc )  - Arrange for interpretive services to assist at discharge as needed  - Refer to Case Management Department for coordinating discharge planning if the patient needs post-hospital services based on physician/advanced practitioner order or complex needs related to functional status, cognitive ability, or social support system  Outcome: Progressing     Problem: Knowledge Deficit  Goal: Patient/family/caregiver demonstrates understanding of disease process, treatment plan, medications, and discharge instructions  Description: Complete learning assessment and assess knowledge base    Interventions:  - Provide teaching at level of understanding  - Provide teaching via preferred learning methods  Outcome: Progressing     Problem: METABOLIC, FLUID AND ELECTROLYTES - ADULT  Goal: Electrolytes maintained within normal limits  Description: INTERVENTIONS:  - Monitor labs and assess patient for signs and symptoms of electrolyte imbalances  - Administer electrolyte replacement as ordered  - Monitor response to electrolyte replacements, including repeat lab results as appropriate  - Instruct patient on fluid and nutrition as appropriate  Outcome: Progressing  Goal: Fluid balance maintained  Description: INTERVENTIONS:  - Monitor labs   - Monitor I/O and WT  - Instruct patient on fluid and nutrition as appropriate  - Assess for signs & symptoms of volume excess or deficit  Outcome: Progressing     Problem: MOBILITY - ADULT  Goal: Maintain or return to baseline ADL function  Description: INTERVENTIONS:  -  Assess patient's ability to carry out ADLs; assess patient's baseline for ADL function and identify physical deficits which impact ability to perform ADLs (bathing, care of mouth/teeth, toileting, grooming, dressing, etc )  - Assess/evaluate cause of self-care deficits   - Assess range of motion  - Assess patient's mobility; develop plan if impaired  - Assess patient's need for assistive devices and provide as appropriate  - Encourage maximum independence but intervene and supervise when necessary  - Involve family in performance of ADLs  - Assess for home care needs following discharge   - Consider OT consult to assist with ADL evaluation and planning for discharge  - Provide patient education as appropriate  Outcome: Progressing  Goal: Maintains/Returns to pre admission functional level  Description: INTERVENTIONS:  - Perform BMAT or MOVE assessment daily    - Set and communicate daily mobility goal to care team and patient/family/caregiver     - Collaborate with rehabilitation services on mobility goals if consulted  - Out of bed for meals 3 times a day  - Out of bed for toileting  - Record patient progress and toleration of activity level   Outcome: Progressing

## 2022-12-15 NOTE — ASSESSMENT & PLAN NOTE
Secondary to rhabdomyolysis  · Treatment plan as written above      Recent Labs     12/13/22  0614 12/14/22  0528 12/15/22  0529   BUN 96* 106* 104*   CREATININE 7 58* 8 44* 8 31*   EGFR 8 7 7       Results from last 7 days   Lab Units 12/12/22  1345   BLOOD UA  Large*   PROTEIN UA mg/dl 100 (2+)*

## 2022-12-15 NOTE — ASSESSMENT & PLAN NOTE
Due to rhabdomyolysis      · Acute hepatitis panel for completion  · Right upper quadrant ultrasound    Recent Labs     12/13/22  0614 12/14/22  0528   AST 1,121* 702*   * 368*   TBILI 0 36 0 35   ALKPHOS 62 55   INR  --  1 06

## 2022-12-15 NOTE — ASSESSMENT & PLAN NOTE
Stable  Possibly a component of dilutional anemia as a result of aggressive IV fluid hydration  · No evidence of acute bleeding at this time  No indication for transfusion        Recent Labs     12/13/22  0614 12/14/22  0528   HGB 10 8* 10 3*   MCV 84 85   RDW 13 9 13 8

## 2022-12-15 NOTE — PROGRESS NOTES
[address] [facility]  Progress Note - Gus Guptaing 1990, 27 y o  male MRN: [id number]  Unit/Bed#: [address] 203-01 Encounter: [id number]  Primary Care Provider: Ely Lewis PA-C   Date and time admitted to hospital: 12/12/2022 11:12 AM    * Rhabdomyolysis  Assessment & Plan  78-year-old male presented to institution due to left lower extremity pain after he woke up from pain as a result of " overdoing it" in the setting of drug use  · CK levels downtrending  · Acidosis improved on bicarbonate drip  Continues to remain on IV fluids  · Management per nephrology  Not stable for discharge yet  · Awaiting renal recovery  Recent Labs     12/13/22  1335 12/14/22  0528 12/15/22  0529   CKTOTAL 48,610* 30,440* 19,980*         Anemia  Assessment & Plan  Stable  Possibly a component of dilutional anemia as a result of aggressive IV fluid hydration  · No evidence of acute bleeding at this time  No indication for transfusion  Recent Labs     12/13/22  0614 12/14/22  0528   HGB 10 8* 10 3*   MCV 84 85   RDW 13 9 13 8         Elevated LFTs  Assessment & Plan  Due to rhabdomyolysis  · Acute hepatitis panel for completion  · Right upper quadrant ultrasound    Recent Labs     12/13/22 0614 12/14/22  0528   AST 1,121* 702*   * 368*   TBILI 0 36 0 35   ALKPHOS 62 55   INR  --  1 06         Leg pain  Assessment & Plan  Doppler ultrasound negative for DVT  Suspect that this is due to post trauma which resulted in his rhabdomyolysis  Substance abuse ([facility] [address] )  Assessment & Plan  History of IV drug abuse, UDS positive for cocaine  · Previously on Suboxone, has been off it for several days, will hold off at this time  · Continue to monitor for withdrawals    SUSANA (acute kidney injury) [facility])  Assessment & Plan  Secondary to rhabdomyolysis  · Treatment plan as written above      Recent Labs     12/13/22 0614 12/14/22  0528 12/15/22  0529   BUN 96* 106* 104*   CREATININE 7 58* 8 44* 8 31*   EGFR 8 7 7       Results from last 7 days   Lab Units 22  1345   BLOOD UA  Large*   PROTEIN UA mg/dl 100 (2+)*         VTE Pharmacologic Prophylaxis:   Pharmacologic: Heparin  Mechanical VTE Prophylaxis in Place: Yes    Discussions with Specialists or Other Care Team Provider: nursing, nephrology    Education and Discussions with Family / Patient: patient    Time Spent for Care: 30 minutes  More than 50% of total time spent on counseling and coordination of care as described above  Current Length of Stay: 3 day(s)    Current Patient Status: Inpatient   Certification Statement: The patient will continue to require additional inpatient hospital stay [facility], nephrology reevaluation, iv hydration    Discharge Plan: active    Code Status: Level 1 - Full Code      Subjective:   Patient seen and examined at bedside  He is in good spirits today  No new complaints  Objective:     Vitals:   Temp (24hrs), Av 2 °F (36 8 °C), Min:98 1 °F (36 7 °C), Max:98 2 °F (36 8 °C)    Temp:  [98 1 °F (36 7 °C)-98 2 °F (36 8 °C)] 98 1 °F (36 7 °C)  HR:  [69-78] 69  Resp:  [18-20] 20  BP: (132-148)/(75-86) 148/86  SpO2:  [96 %-99 %] 99 %  Body mass index is 30 77 kg/m²  Input and Output Summary (last 24 hours): Intake/Output Summary (Last 24 hours) at 12/15/2022 1835  Last data filed at 12/15/2022 1117  Gross per 24 hour   Intake --   Output 1625 ml   Net -1625 ml       Physical Exam:     Physical Exam  Vitals reviewed  Constitutional:       General: He is not in acute distress  HENT:      Head: Normocephalic  Nose: Nose normal       Mouth/Throat:      Mouth: Mucous membranes are moist    Eyes:      General: No scleral icterus  Cardiovascular:      Rate and Rhythm: Normal rate and regular rhythm  Pulmonary:      Effort: Pulmonary effort is normal  No respiratory distress  Breath sounds: No wheezing or rales  Abdominal:      General: There is no distension  Palpations: Abdomen is soft  Tenderness: There is no abdominal tenderness  Skin:     General: Skin is warm  Neurological:      Mental Status: He is alert and oriented to person, place, and time  Psychiatric:         Mood and Affect: Mood normal          Behavior: Behavior normal        Additional Data:     Labs:    Results from last 7 days   Lab Units 12/14/22  0528 12/12/22  1659 12/12/22  1208   WBC Thousand/uL 7 79   < > 10 90*   HEMOGLOBIN g/dL 10 3*   < > 12 7   HEMATOCRIT % 29 9*   < > 37 4   PLATELETS Thousands/uL 259   < > 305   NEUTROS PCT %  --   --  80*   LYMPHS PCT %  --   --  12*   MONOS PCT %  --   --  7   EOS PCT %  --   --  0    < > = values in this interval not displayed  Results from last 7 days   Lab Units 12/15/22  0529 12/14/22  0528   SODIUM mmol/L 137 136   POTASSIUM mmol/L 4 0 3 7   CHLORIDE mmol/L 99 96   CO2 mmol/L 26 24   BUN mg/dL 104* 106*   CREATININE mg/dL 8 31* 8 44*   ANION GAP mmol/L 12 16*   CALCIUM mg/dL 8 6 8 0*   ALBUMIN g/dL  --  2 6*   TOTAL BILIRUBIN mg/dL  --  0 35   ALK PHOS U/L  --  55   ALT U/L  --  368*   AST U/L  --  702*   GLUCOSE RANDOM mg/dL 102 128     Results from last 7 days   Lab Units 12/14/22  0528   INR  1 06                       * I Have Reviewed All Lab Data Listed Above  * Additional Pertinent Lab Tests Reviewed:  [facility]      Lines:  Invasive Devices     Peripheral Intravenous Line  Duration           Peripheral IV 12/12/22 Right Antecubital 3 days               Imaging:    Imaging Reports Reviewed Today Include: no new imaging    Recent Cultures (last 7 days):           Last 24 Hours Medication List:   Current Facility-Administered Medications   Medication Dose Route Frequency Provider Last Rate   • heparin (porcine)  5,000 Units Subcutaneous Q8H Francheska Toussaint MD     • HYDROmorphone  0 5 mg Intravenous Q6H PRGALO Reynolds MD     • melatonin  3 mg Oral HS Page Mukherjee PA-C     • multi-electrolyte  125 mL/hr Intravenous Continuous Vero Soriano  mL/hr (12/15/22 1814)   • nicotine  1 patch Transdermal Daily Jose Maddox MD     • oxyCODONE  10 mg Oral Q6H PRN Jose Maddox MD     • oxyCODONE  5 mg Oral Q6H PRN Jose Maddox MD          Today, Patient Was Seen By: Elias Babcock MD    ** Please Note: Dictation voice to text software may have been used in the creation of this document   **

## 2022-12-15 NOTE — CASE MANAGEMENT
Case Management Discharge Planning Note    Patient name Erich Kayser  Location [place] [address] [facility]* MRN [id number]  : 1990 Date 12/15/2022       Current Admission Date: 2022  Current Admission Diagnosis:Rhabdomyolysis   Patient Active Problem List    Diagnosis Date Noted   • Anemia 2022   • Rhabdomyolysis 2022   • SUSANA (acute kidney injury) ([facility] [address] ) 2022   • Substance abuse ([facility] [address] ) 2022   • Leg pain 2022   • Elevated LFTs 2022      LOS (days): 3  Geometric Mean LOS (GMLOS) (days):   Days to GMLOS:     OBJECTIVE:  Risk of Unplanned Readmission Score: 17 86         Current admission status: Inpatient   Preferred Pharmacy:   [address] [address]  Phone: [phone] Fax: [phone]    Primary Care Provider: Lluvia Byrnes PA-C    Primary Insurance: [employer]  Secondary Insurance:     DISCHARGE DETAILS:  Mother in room, patient teary eyed, CM explained why he can't be d/c today  Pt now agreeable to HOST  Pt signed medical information release form and CM faxed to Medical Records  CM contacted HOST  Patient recently left [employer]

## 2022-12-15 NOTE — ASSESSMENT & PLAN NOTE
History of IV drug abuse, UDS positive for cocaine  · Previously on Suboxone, has been off it for several days, will hold off at this time  · Continue to monitor for withdrawals

## 2022-12-15 NOTE — ASSESSMENT & PLAN NOTE
Doppler ultrasound negative for DVT  Suspect that this is due to post trauma which resulted in his rhabdomyolysis

## 2022-12-16 ENCOUNTER — APPOINTMENT (INPATIENT)
Dept: ULTRASOUND IMAGING | Facility: HOSPITAL | Age: 32
End: 2022-12-16

## 2022-12-16 LAB
ALBUMIN SERPL BCP-MCNC: 2.5 G/DL (ref 3.5–5)
ALP SERPL-CCNC: 47 U/L (ref 46–116)
ALT SERPL W P-5'-P-CCNC: 257 U/L (ref 12–78)
ANION GAP SERPL CALCULATED.3IONS-SCNC: 14 MMOL/L (ref 4–13)
AST SERPL W P-5'-P-CCNC: 286 U/L (ref 5–45)
BILIRUB SERPL-MCNC: 0.35 MG/DL (ref 0.2–1)
BUN SERPL-MCNC: 97 MG/DL (ref 5–25)
CALCIUM ALBUM COR SERPL-MCNC: 9.7 MG/DL (ref 8.3–10.1)
CALCIUM SERPL-MCNC: 8.5 MG/DL (ref 8.3–10.1)
CHLORIDE SERPL-SCNC: 99 MMOL/L (ref 96–108)
CK MB SERPL-MCNC: 9.2 NG/ML (ref 0–5)
CK MB SERPL-MCNC: <1 % (ref 0–2.5)
CK SERPL-CCNC: ABNORMAL U/L (ref 39–308)
CO2 SERPL-SCNC: 27 MMOL/L (ref 21–32)
CREAT SERPL-MCNC: 7.49 MG/DL (ref 0.6–1.3)
GFR SERPL CREATININE-BSD FRML MDRD: 8 ML/MIN/1.73SQ M
GLUCOSE SERPL-MCNC: 98 MG/DL (ref 65–140)
MAGNESIUM SERPL-MCNC: 2.4 MG/DL (ref 1.6–2.6)
POTASSIUM SERPL-SCNC: 4.1 MMOL/L (ref 3.5–5.3)
PROT SERPL-MCNC: 5.8 G/DL (ref 6.4–8.4)
SODIUM SERPL-SCNC: 140 MMOL/L (ref 135–147)

## 2022-12-16 RX ORDER — ACETAMINOPHEN 325 MG/1
650 TABLET ORAL ONCE
Status: COMPLETED | OUTPATIENT
Start: 2022-12-16 | End: 2022-12-16

## 2022-12-16 RX ORDER — HYDROXYZINE HYDROCHLORIDE 25 MG/1
25 TABLET, FILM COATED ORAL EVERY 6 HOURS PRN
Status: DISCONTINUED | OUTPATIENT
Start: 2022-12-16 | End: 2022-12-21 | Stop reason: HOSPADM

## 2022-12-16 RX ADMIN — SODIUM CHLORIDE, SODIUM GLUCONATE, SODIUM ACETATE, POTASSIUM CHLORIDE, MAGNESIUM CHLORIDE, SODIUM PHOSPHATE, DIBASIC, AND POTASSIUM PHOSPHATE 125 ML/HR: .53; .5; .37; .037; .03; .012; .00082 INJECTION, SOLUTION INTRAVENOUS at 03:29

## 2022-12-16 RX ADMIN — SODIUM CHLORIDE, SODIUM GLUCONATE, SODIUM ACETATE, POTASSIUM CHLORIDE, MAGNESIUM CHLORIDE, SODIUM PHOSPHATE, DIBASIC, AND POTASSIUM PHOSPHATE 125 ML/HR: .53; .5; .37; .037; .03; .012; .00082 INJECTION, SOLUTION INTRAVENOUS at 10:31

## 2022-12-16 RX ADMIN — ACETAMINOPHEN 650 MG: 325 TABLET, FILM COATED ORAL at 20:11

## 2022-12-16 RX ADMIN — HEPARIN SODIUM 5000 UNITS: 5000 INJECTION INTRAVENOUS; SUBCUTANEOUS at 05:03

## 2022-12-16 RX ADMIN — HEPARIN SODIUM 5000 UNITS: 5000 INJECTION INTRAVENOUS; SUBCUTANEOUS at 21:47

## 2022-12-16 RX ADMIN — Medication 3 MG: at 21:47

## 2022-12-16 RX ADMIN — SODIUM CHLORIDE, SODIUM GLUCONATE, SODIUM ACETATE, POTASSIUM CHLORIDE, MAGNESIUM CHLORIDE, SODIUM PHOSPHATE, DIBASIC, AND POTASSIUM PHOSPHATE 75 ML/HR: .53; .5; .37; .037; .03; .012; .00082 INJECTION, SOLUTION INTRAVENOUS at 23:47

## 2022-12-16 RX ADMIN — OXYCODONE HYDROCHLORIDE 10 MG: 10 TABLET ORAL at 01:52

## 2022-12-16 RX ADMIN — HEPARIN SODIUM 5000 UNITS: 5000 INJECTION INTRAVENOUS; SUBCUTANEOUS at 15:18

## 2022-12-16 RX ADMIN — HYDROXYZINE HYDROCHLORIDE 25 MG: 25 TABLET ORAL at 23:54

## 2022-12-16 NOTE — ASSESSMENT & PLAN NOTE
Doppler ultrasound negative for DVT  Suspect that this is due to post trauma which resulted in his rhabdomyolysis  Improving   Able to move toes, sensation intact

## 2022-12-16 NOTE — UTILIZATION REVIEW
Continued Stay Review    Date: 12/16/2022                        Current Patient Class: IP Current Level of Care: MS    HPI:32 y o  male initially admitted on 12/12/2022    Assessment/Plan: Pt reported that his L leg is more swollen than the day before  L leg numbness and tingling improved  CK down to 11,117  Creatinine down to 7 49  Acidosis resolved w/ bicarb gtt  AST/ALT improving  RUQ US w/ no acute findings  Doppler US neg for DVT  Cont IVF, decrease rate  Follow daily labs  Follow daily CK       Vital Signs: /75   Pulse 76   Temp 98 2 °F (36 8 °C)   Resp 16   Ht 5' 8" (1 727 m)   Wt 99 4 kg (219 lb 2 2 oz)   SpO2 99%   BMI 33 32 kg/m²       Pertinent Labs/Diagnostic Results:   12/16 US RUQ w/ liver dopplers: Normal       Results from last 7 days   Lab Units 12/13/22  0614   SARS-COV-2  Negative     Results from last 7 days   Lab Units 12/14/22  0528 12/13/22  0614 12/12/22  1659 12/12/22  1208   WBC Thousand/uL 7 79 9 04  --  10 90*   HEMOGLOBIN g/dL 10 3* 10 8*  --  12 7   HEMATOCRIT % 29 9* 31 6*  --  37 4   PLATELETS Thousands/uL 259 279 285 305   NEUTROS ABS Thousands/µL  --   --   --  8 81*         Results from last 7 days   Lab Units 12/16/22  0457 12/15/22  0529 12/14/22  0528 12/13/22  0614 12/12/22  1208   SODIUM mmol/L 140 137 136 133* 128*   POTASSIUM mmol/L 4 1 4 0 3 7 4 6 5 1   CHLORIDE mmol/L 99 99 96 98 94*   CO2 mmol/L 27 26 24 17* 20*   ANION GAP mmol/L 14* 12 16* 18* 14*   BUN mg/dL 97* 104* 106* 96* 86*   CREATININE mg/dL 7 49* 8 31* 8 44* 7 58* 6 46*   EGFR ml/min/1 73sq m 8 7 7 8 10   CALCIUM mg/dL 8 5 8 6 8 0* 7 9* 8 3   MAGNESIUM mg/dL 2 4  --   --   --   --      Results from last 7 days   Lab Units 12/16/22 0457 12/14/22  0528 12/13/22  0614 12/12/22  1208   AST U/L 286* 702* 1,121* 1,848*   ALT U/L 257* 368* 463* 593*   ALK PHOS U/L 47 55 62 73   TOTAL PROTEIN g/dL 5 8* 6 0* 6 5 7 5   ALBUMIN g/dL 2 5* 2 6* 2 8* 3 3*   TOTAL BILIRUBIN mg/dL 0 35 0 35 0 36 0 39 Results from last 7 days   Lab Units 12/16/22  0457 12/15/22  0529 12/14/22  0528 12/13/22  0614 12/12/22  1208   GLUCOSE RANDOM mg/dL 98 102 128 95 98     Results from last 7 days   Lab Units 12/16/22  0457 12/15/22  0529 12/14/22  0528   CK TOTAL U/L 11,117* 19,980* 30,440*   CK MB INDEX % <1 0 <1 0 <1 0   CK MB ng/mL 9 2* 20 0* 58 8*             Results from last 7 days   Lab Units 12/14/22  0528 12/12/22  1342   PROTIME seconds 13 9 13 4   INR  1 06 1 02           Results from last 7 days   Lab Units 12/12/22  1345   CLARITY UA  Clear   COLOR UA  Yellow   SPEC GRAV UA  1 025   PH UA  5 0   GLUCOSE UA mg/dl Negative   KETONES UA mg/dl Negative   BLOOD UA  Large*   PROTEIN UA mg/dl 100 (2+)*   NITRITE UA  Negative   BILIRUBIN UA  Negative   UROBILINOGEN UA E U /dl 0 2   LEUKOCYTES UA  Negative   WBC UA /hpf None Seen   RBC UA /hpf None Seen   BACTERIA UA /hpf Occasional   EPITHELIAL CELLS WET PREP /hpf Occasional     Results from last 7 days   Lab Units 12/13/22  0614   INFLUENZA A PCR  Negative   INFLUENZA B PCR  Negative   RSV PCR  Negative         Results from last 7 days   Lab Units 12/12/22  1347   AMPH/METH  Negative   BARBITURATE UR  Negative   BENZODIAZEPINE UR  Negative   COCAINE UR  Positive*   METHADONE URINE  Negative   OPIATE UR  Negative   PCP UR  Negative   THC UR  Negative       Medications:   Scheduled Medications:  heparin (porcine), 5,000 Units, Subcutaneous, Q8H [address]  melatonin, 3 mg, Oral, HS  nicotine, 1 patch, Transdermal, Daily      Continuous IV Infusions:  multi-electrolyte, 75 mL/hr, Intravenous, Continuous      PRN Meds:  HYDROmorphone, 0 5 mg, Intravenous, Q6H PRN  oxyCODONE, 10 mg, Oral, Q6H PRN 12/16 x 1  oxyCODONE, 5 mg, Oral, Q6H PRN        Discharge Plan: TBD    Network Utilization Review Department  ATTENTION: Please call with any questions or concerns to [phone] and carefully listen to the prompts so that you are directed to the right person   All voicemails are confidential   Saniya Thomas all requests for admission clinical reviews, approved or denied determinations and any other requests to dedicated fax number below belonging to the campus where the patient is receiving treatment   List of dedicated fax numbers for the Facilities:  [address] DENIALS (Administrative/Medical Necessity) [phone]   [address] (Maternity/NICU/Pediatrics) [phone]   [address] [phone]   [facility] [address] [phone]   [address] [phone]   [address] [facility] [address] 815 [facility] [phone]

## 2022-12-16 NOTE — PLAN OF CARE
Problem: PAIN - ADULT  Goal: Verbalizes/displays adequate comfort level or baseline comfort level  Description: Interventions:  - Encourage patient to monitor pain and request assistance  - Assess pain using appropriate pain scale  - Administer analgesics based on type and severity of pain and evaluate response  - Implement non-pharmacological measures as appropriate and evaluate response  - Consider cultural and social influences on pain and pain management  - Notify physician/advanced practitioner if interventions unsuccessful or patient reports new pain  Outcome: Progressing     Problem: INFECTION - ADULT  Goal: Absence or prevention of progression during hospitalization  Description: INTERVENTIONS:  - Assess and monitor for signs and symptoms of infection  - Monitor lab/diagnostic results  - Monitor all insertion sites, i e  indwelling lines, tubes, and drains  - Monitor endotracheal if appropriate and nasal secretions for changes in amount and color  - [place] appropriate cooling/warming therapies per order  - Administer medications as ordered  - Instruct and encourage patient and family to use good hand hygiene technique  - Identify and instruct in appropriate isolation precautions for identified infection/condition  Outcome: Progressing     Problem: SAFETY ADULT  Goal: Patient will remain free of falls  Description: INTERVENTIONS:  - Educate patient/family on patient safety including physical limitations  - Instruct patient to call for assistance with activity   - Consult OT/PT to assist with strengthening/mobility   - Keep Call bell within reach  - Keep bed low and locked with side rails adjusted as appropriate  - Keep care items and personal belongings within reach  - Initiate and maintain comfort rounds  - Apply yellow socks and bracelet for high fall risk patients  - Consider moving patient to room near nurses station  Outcome: Progressing  Goal: Maintain or return to baseline ADL function  Description: INTERVENTIONS:  -  Assess patient's ability to carry out ADLs; assess patient's baseline for ADL function and identify physical deficits which impact ability to perform ADLs (bathing, care of mouth/teeth, toileting, grooming, dressing, etc )  - Assess/evaluate cause of self-care deficits   - Assess range of motion  - Assess patient's mobility; develop plan if impaired  - Assess patient's need for assistive devices and provide as appropriate  - Encourage maximum independence but intervene and supervise when necessary  - Involve family in performance of ADLs  - Assess for home care needs following discharge   - Consider OT consult to assist with ADL evaluation and planning for discharge  - Provide patient education as appropriate  Outcome: Progressing  Goal: Maintains/Returns to pre admission functional level  Description: INTERVENTIONS:  - Perform BMAT or MOVE assessment daily    - Set and communicate daily mobility goal to care team and patient/family/caregiver     - Collaborate with rehabilitation services on mobility goals if consulted  - Out of bed for meals 3 times a day  - Out of bed for toileting  - Record patient progress and toleration of activity level   Outcome: Progressing     Problem: DISCHARGE PLANNING  Goal: Discharge to home or other facility with appropriate resources  Description: INTERVENTIONS:  - Identify barriers to discharge w/patient and caregiver  - Arrange for needed discharge resources and transportation as appropriate  - Identify discharge learning needs (meds, wound care, etc )  - Arrange for interpretive services to assist at discharge as needed  - Refer to Case Management Department for coordinating discharge planning if the patient needs post-hospital services based on physician/advanced practitioner order or complex needs related to functional status, cognitive ability, or social support system  Outcome: Progressing     Problem: Knowledge Deficit  Goal: Patient/family/caregiver demonstrates understanding of disease process, treatment plan, medications, and discharge instructions  Description: Complete learning assessment and assess knowledge base    Interventions:  - Provide teaching at level of understanding  - Provide teaching via preferred learning methods  Outcome: Progressing     Problem: METABOLIC, FLUID AND ELECTROLYTES - ADULT  Goal: Electrolytes maintained within normal limits  Description: INTERVENTIONS:  - Monitor labs and assess patient for signs and symptoms of electrolyte imbalances  - Administer electrolyte replacement as ordered  - Monitor response to electrolyte replacements, including repeat lab results as appropriate  - Instruct patient on fluid and nutrition as appropriate  Outcome: Progressing  Goal: Fluid balance maintained  Description: INTERVENTIONS:  - Monitor labs   - Monitor I/O and WT  - Instruct patient on fluid and nutrition as appropriate  - Assess for signs & symptoms of volume excess or deficit  Outcome: Progressing     Problem: MOBILITY - ADULT  Goal: Maintain or return to baseline ADL function  Description: INTERVENTIONS:  -  Assess patient's ability to carry out ADLs; assess patient's baseline for ADL function and identify physical deficits which impact ability to perform ADLs (bathing, care of mouth/teeth, toileting, grooming, dressing, etc )  - Assess/evaluate cause of self-care deficits   - Assess range of motion  - Assess patient's mobility; develop plan if impaired  - Assess patient's need for assistive devices and provide as appropriate  - Encourage maximum independence but intervene and supervise when necessary  - Involve family in performance of ADLs  - Assess for home care needs following discharge   - Consider OT consult to assist with ADL evaluation and planning for discharge  - Provide patient education as appropriate  Outcome: Progressing  Goal: Maintains/Returns to pre admission functional level  Description: INTERVENTIONS:  - Perform BMAT or MOVE assessment daily    - Set and communicate daily mobility goal to care team and patient/family/caregiver     - Collaborate with rehabilitation services on mobility goals if consulted  - Out of bed for meals 3 times a day  - Out of bed for toileting  - Record patient progress and toleration of activity level   Outcome: Progressing

## 2022-12-16 NOTE — ASSESSMENT & PLAN NOTE
Secondary to rhabdomyolysis  · Treatment plan as written above      Recent Labs     12/14/22  0528 12/15/22  0529 12/16/22  0457   * 104* 97*   CREATININE 8 44* 8 31* 7 49*   EGFR 7 7 8       Results from last 7 days   Lab Units 12/12/22  1345   BLOOD UA  Large*   PROTEIN UA mg/dl 100 (2+)*

## 2022-12-16 NOTE — ASSESSMENT & PLAN NOTE
Due to rhabdomyolysis      · Acute hepatitis panel for completion  · Right upper quadrant ultrasound showing no acute findings    Recent Labs     12/14/22  0528 12/16/22  0457   * 286*   * 257*   TBILI 0 35 0 35   ALKPHOS 55 47   INR 1 06  --

## 2022-12-16 NOTE — ASSESSMENT & PLAN NOTE
Stable  Possibly a component of dilutional anemia as a result of aggressive IV fluid hydration  · No evidence of acute bleeding at this time  No indication for transfusion        Recent Labs     12/14/22  0528   HGB 10 3*   MCV 85   RDW 13 8

## 2022-12-16 NOTE — PLAN OF CARE
Problem: PAIN - ADULT  Goal: Verbalizes/displays adequate comfort level or baseline comfort level  Description: Interventions:  - Encourage patient to monitor pain and request assistance  - Assess pain using appropriate pain scale  - Administer analgesics based on type and severity of pain and evaluate response  - Implement non-pharmacological measures as appropriate and evaluate response  - Consider cultural and social influences on pain and pain management  - Notify physician/advanced practitioner if interventions unsuccessful or patient reports new pain  Outcome: Progressing     Problem: INFECTION - ADULT  Goal: Absence or prevention of progression during hospitalization  Description: INTERVENTIONS:  - Assess and monitor for signs and symptoms of infection  - Monitor lab/diagnostic results  - Monitor all insertion sites, i e  indwelling lines, tubes, and drains  - Monitor endotracheal if appropriate and nasal secretions for changes in amount and color  - [place] appropriate cooling/warming therapies per order  - Administer medications as ordered  - Instruct and encourage patient and family to use good hand hygiene technique  - Identify and instruct in appropriate isolation precautions for identified infection/condition  Outcome: Progressing     Problem: SAFETY ADULT  Goal: Patient will remain free of falls  Description: INTERVENTIONS:  - Educate patient/family on patient safety including physical limitations  - Instruct patient to call for assistance with activity   - Consult OT/PT to assist with strengthening/mobility   - Keep Call bell within reach  - Keep bed low and locked with side rails adjusted as appropriate  - Keep care items and personal belongings within reach  - Initiate and maintain comfort rounds  - Apply yellow socks and bracelet for high fall risk patients  - Consider moving patient to room near nurses station  Outcome: Progressing  Goal: Maintain or return to baseline ADL function  Description: INTERVENTIONS:  -  Assess patient's ability to carry out ADLs; assess patient's baseline for ADL function and identify physical deficits which impact ability to perform ADLs (bathing, care of mouth/teeth, toileting, grooming, dressing, etc )  - Assess/evaluate cause of self-care deficits   - Assess range of motion  - Assess patient's mobility; develop plan if impaired  - Assess patient's need for assistive devices and provide as appropriate  - Encourage maximum independence but intervene and supervise when necessary  - Involve family in performance of ADLs  - Assess for home care needs following discharge   - Consider OT consult to assist with ADL evaluation and planning for discharge  - Provide patient education as appropriate  Outcome: Progressing  Goal: Maintains/Returns to pre admission functional level  Description: INTERVENTIONS:  - Perform BMAT or MOVE assessment daily    - Set and communicate daily mobility goal to care team and patient/family/caregiver     - Collaborate with rehabilitation services on mobility goals if consulted  - Out of bed for meals 3 times a day  - Out of bed for toileting  - Record patient progress and toleration of activity level   Outcome: Progressing     Problem: DISCHARGE PLANNING  Goal: Discharge to home or other facility with appropriate resources  Description: INTERVENTIONS:  - Identify barriers to discharge w/patient and caregiver  - Arrange for needed discharge resources and transportation as appropriate  - Identify discharge learning needs (meds, wound care, etc )  - Arrange for interpretive services to assist at discharge as needed  - Refer to Case Management Department for coordinating discharge planning if the patient needs post-hospital services based on physician/advanced practitioner order or complex needs related to functional status, cognitive ability, or social support system  Outcome: Progressing     Problem: Knowledge Deficit  Goal: Patient/family/caregiver demonstrates understanding of disease process, treatment plan, medications, and discharge instructions  Description: Complete learning assessment and assess knowledge base    Interventions:  - Provide teaching at level of understanding  - Provide teaching via preferred learning methods  Outcome: Progressing     Problem: METABOLIC, FLUID AND ELECTROLYTES - ADULT  Goal: Electrolytes maintained within normal limits  Description: INTERVENTIONS:  - Monitor labs and assess patient for signs and symptoms of electrolyte imbalances  - Administer electrolyte replacement as ordered  - Monitor response to electrolyte replacements, including repeat lab results as appropriate  - Instruct patient on fluid and nutrition as appropriate  Outcome: Progressing  Goal: Fluid balance maintained  Description: INTERVENTIONS:  - Monitor labs   - Monitor I/O and WT  - Instruct patient on fluid and nutrition as appropriate  - Assess for signs & symptoms of volume excess or deficit  Outcome: Progressing     Problem: MOBILITY - ADULT  Goal: Maintain or return to baseline ADL function  Description: INTERVENTIONS:  -  Assess patient's ability to carry out ADLs; assess patient's baseline for ADL function and identify physical deficits which impact ability to perform ADLs (bathing, care of mouth/teeth, toileting, grooming, dressing, etc )  - Assess/evaluate cause of self-care deficits   - Assess range of motion  - Assess patient's mobility; develop plan if impaired  - Assess patient's need for assistive devices and provide as appropriate  - Encourage maximum independence but intervene and supervise when necessary  - Involve family in performance of ADLs  - Assess for home care needs following discharge   - Consider OT consult to assist with ADL evaluation and planning for discharge  - Provide patient education as appropriate  Outcome: Progressing  Goal: Maintains/Returns to pre admission functional level  Description: INTERVENTIONS:  - Perform BMAT or MOVE assessment daily    - Set and communicate daily mobility goal to care team and patient/family/caregiver     - Collaborate with rehabilitation services on mobility goals if consulted  - Out of bed for meals 3 times a day  - Out of bed for toileting  - Record patient progress and toleration of activity level   Outcome: Progressing

## 2022-12-16 NOTE — CASE MANAGEMENT
Case Management Discharge Planning Note    Patient name Cecile Lara  Location [place] [address] Aba Melendez* MRN [id number]  : 1990 Date 2022       Current Admission Date: 2022  Current Admission Diagnosis:Rhabdomyolysis   Patient Active Problem List    Diagnosis Date Noted   • Anemia 2022   • Rhabdomyolysis 2022   • SUSANA (acute kidney injury) ([facility] [address] ) 2022   • Substance abuse ([facility] [address] ) 2022   • Leg pain 2022   • Elevated LFTs 2022      LOS (days): 4  Geometric Mean LOS (GMLOS) (days):   Days to GMLOS:     OBJECTIVE:  Risk of Unplanned Readmission Score: 18 07         Current admission status: Inpatient   Preferred Pharmacy:   [address] [address]  Phone: [phone] Fax: [phone]    Primary Care Provider: Claudeen Shock, PA-C    Primary Insurance: [facility]  Secondary Insurance:     DISCHARGE DETAILS  Pt signed medical release of information form 12/15 which CM faxed to MR     facesheet, MAR & H&P faxed to HOST as requested

## 2022-12-16 NOTE — PROGRESS NOTES
[facility]  Progress Note - Fayrene [place] 1990, 27 y o  male MRN: [id number]  Unit/Bed#: [address] Encounter: [id number]  Primary Care Provider: Douglas Oliveira PA-C   Date and time admitted to hospital: 12/12/2022 11:12 AM    * Rhabdomyolysis  Assessment & Plan  66-year-old male presented to institution due to left lower extremity pain after he woke up from pain as a result of " overdoing it" in the setting of drug use  · CK levels continue to downtrend  · Acidosis resolved with bicarbonate drip  · Continues to remain on IV fluids  · Management per nephrology  Not stable for discharge yet  · Awaiting renal recovery  Recent Labs     12/14/22  0528 12/15/22  0529 12/16/22  0457   CKTOTAL 30,440* 19,980* 11,117*         Anemia  Assessment & Plan  Stable  Possibly a component of dilutional anemia as a result of aggressive IV fluid hydration  · No evidence of acute bleeding at this time  No indication for transfusion  Recent Labs     12/14/22 0528   HGB 10 3*   MCV 85   RDW 13 8         Elevated LFTs  Assessment & Plan  Due to rhabdomyolysis  · Acute hepatitis panel for completion  · Right upper quadrant ultrasound showing no acute findings    Recent Labs     12/14/22 0528 12/16/22 0457   * 286*   * 257*   TBILI 0 35 0 35   ALKPHOS 55 47   INR 1 06  --          Leg pain  Assessment & Plan  Doppler ultrasound negative for DVT  Suspect that this is due to post trauma which resulted in his rhabdomyolysis  Improving  Able to move toes, sensation intact    Substance abuse ([facility] [address] )  Assessment & Plan  History of IV drug abuse, UDS positive for cocaine  · Previously on Suboxone, has been off it for several days, will hold off at this time  · Continue to monitor for withdrawals    SUSANA (acute kidney injury) [facility])  Assessment & Plan  Secondary to rhabdomyolysis  · Treatment plan as written above      Recent Labs     12/14/22  0528 12/15/22  0529 22  0457   * 104* 97*   CREATININE 8 44* 8 31* 7 49*   EGFR 7 7 8       Results from last 7 days   Lab Units 22  1345   BLOOD UA  Large*   PROTEIN UA mg/dl 100 (2+)*         VTE Pharmacologic Prophylaxis:   Pharmacologic: Heparin  Mechanical VTE Prophylaxis in Place: Yes    Discussions with Specialists or Other Care Team Provider: nursing    Education and Discussions with Family / Patient: patient    Time Spent for Care: 30 minutes  More than 50% of total time spent on counseling and coordination of care as described above  Current Length of Stay: 4 day(s)    Current Patient Status: Inpatient   Certification Statement: The patient will continue to require additional inpatient hospital stay due to iv hydration, renal reeval, radha management, rhabdo    Discharge Plan: active    Code Status: Level 1 - Full Code      Subjective:   Patient seen and examined at bedside  He was inquiring about his status and dispo date  I explained to him that it is still premature to discharge him  Nephrology has not cleared him yet  He is aware that he would be able to sign out himself at anytime, but he understands that doing so puts himself at risk for renal failure  He understood and decided to remain inpatient at this time  Objective:     Vitals:   Temp (24hrs), Av 1 °F (36 7 °C), Min:97 7 °F (36 5 °C), Max:98 5 °F (36 9 °C)    Temp:  [97 7 °F (36 5 °C)-98 5 °F (36 9 °C)] 98 2 °F (36 8 °C)  HR:  [67-76] 76  Resp:  [16-18] 16  BP: (140-153)/(75-85) 140/75  SpO2:  [97 %-100 %] 99 %  Body mass index is 33 32 kg/m²  Input and Output Summary (last 24 hours): Intake/Output Summary (Last 24 hours) at 2022 1620  Last data filed at 2022 1138  Gross per 24 hour   Intake 1139 58 ml   Output --   Net 1139 58 ml       Physical Exam:     Physical Exam  Vitals reviewed  Constitutional:       General: He is not in acute distress  HENT:      Head: Normocephalic        Nose: Nose normal  Mouth/Throat:      Mouth: Mucous membranes are moist    Eyes:      General: No scleral icterus  Cardiovascular:      Rate and Rhythm: Normal rate and regular rhythm  Pulmonary:      Effort: Pulmonary effort is normal  No respiratory distress  Breath sounds: No wheezing  Abdominal:      General: There is no distension  Palpations: Abdomen is soft  Tenderness: There is no abdominal tenderness  Musculoskeletal:         General: No tenderness  Left lower leg: Edema present  Skin:     General: Skin is warm  Neurological:      Mental Status: He is alert and oriented to person, place, and time  Psychiatric:         Mood and Affect: Mood normal          Behavior: Behavior normal        Additional Data:     Labs:    Results from last 7 days   Lab Units 12/14/22  0528 12/12/22  1659 12/12/22  1208   WBC Thousand/uL 7 79   < > 10 90*   HEMOGLOBIN g/dL 10 3*   < > 12 7   HEMATOCRIT % 29 9*   < > 37 4   PLATELETS Thousands/uL 259   < > 305   NEUTROS PCT %  --   --  80*   LYMPHS PCT %  --   --  12*   MONOS PCT %  --   --  7   EOS PCT %  --   --  0    < > = values in this interval not displayed  Results from last 7 days   Lab Units 12/16/22  0457   SODIUM mmol/L 140   POTASSIUM mmol/L 4 1   CHLORIDE mmol/L 99   CO2 mmol/L 27   BUN mg/dL 97*   CREATININE mg/dL 7 49*   ANION GAP mmol/L 14*   CALCIUM mg/dL 8 5   ALBUMIN g/dL 2 5*   TOTAL BILIRUBIN mg/dL 0 35   ALK PHOS U/L 47   ALT U/L 257*   AST U/L 286*   GLUCOSE RANDOM mg/dL 98     Results from last 7 days   Lab Units 12/14/22  0528   INR  1 06                       * I Have Reviewed All Lab Data Listed Above  * Additional Pertinent Lab Tests Reviewed:  [address] Admission Reviewed      Lines:  Invasive Devices     Peripheral Intravenous Line  Duration           Peripheral IV 12/16/22 Left;Proximal;Ventral (anterior) Forearm <1 day               Imaging:    Imaging Reports Reviewed Today Include: us ruq    Recent Cultures (last 7 days):           Last 24 Hours Medication List:   Current Facility-Administered Medications   Medication Dose Route Frequency Provider Last Rate   • heparin (porcine)  5,000 Units Subcutaneous Q8H [address] Mike Rodriguez MD     • HYDROmorphone  0 5 mg Intravenous Q6H PRN Mike Rodriguez MD     • melatonin  3 mg Oral HS Sierra Egan PA-C     • multi-electrolyte  75 mL/hr Intravenous Continuous Gale Sims, CRNP 75 mL/hr (12/16/22 1138)   • nicotine  1 patch Transdermal Daily Mike Rodriguez MD     • oxyCODONE  10 mg Oral Q6H PRN Mike Rodriguez MD     • oxyCODONE  5 mg Oral Q6H PRN Mike Rodriguez MD          Today, Patient Was Seen By: Giovanny Aguila MD    ** Please Note: Dictation voice to text software may have been used in the creation of this document   **

## 2022-12-16 NOTE — ASSESSMENT & PLAN NOTE
26-year-old male presented to institution due to left lower extremity pain after he woke up from pain as a result of " overdoing it" in the setting of drug use  · CK levels continue to downtrend  · Acidosis resolved with bicarbonate drip  · Continues to remain on IV fluids  · Management per nephrology  Not stable for discharge yet  · Awaiting renal recovery        Recent Labs     12/14/22  0528 12/15/22  0529 12/16/22  0457   CKTOTAL 30,440* 19,980* 11,117*

## 2022-12-16 NOTE — PROGRESS NOTES
NEPHROLOGY PROGRESS NOTE   Silvano Mercado 27 y o  male MRN: [id number]  Unit/Bed#: [facility] 2 -01 Encounter: [id number]      HPI/24hr EVENTS:    -73-year-old male past medical history of substance abuse, hep C  Presented to the hospital with left leg pain and decreased urination, did not have recall of events leading to hospitalization  Nephrology consulted for management of SUSANA  -Creatinine improved from day prior, left leg noted to be more swollen    ASSESSMENT/PLAN:    Severe SUSANA (POA)  -Baseline creatinine: 0 8-0 9 as reviewed in Care Everywhere  -Creatinine on admission 6 46, peaked at 8 44 on 12/14, most recent creatinine 7 49  -Etiology: Suspect pigment nephropathy second to rhabdomyolysis  -UA: Large blood, 2+ protein, no RBCs  -Renal imaging: CT abdomen pelvis with severe bladder wall thickening, no hydronephrosis  -Avoid hypotension, avoid nephrotoxins, avoid NSAIDS  -Trend BMP  -Urinary retention protocol, bladder scans  -Urine output was greater than 1 L yesterday  -Discussed risks and benefits of renal replacement therapy including infection, arrhythmia, hypotension, sudden cardiac death    Consent obtained from patient and placed in chart  -no current indication for renal placement therapy at this time  -Prior on bicarbonate infusion for metabolic acidosis is currently on Plasma-Lyte at 125 mL/h, can consider discontinuing versus decreasing rate     Anion gap metabolic acidosis (resolved)  -In the setting of severe SUSANA  -Bicarb 27, anion gap 14  -Resolved with bicarb drip     Rhabdomyolysis/left leg pain  -Unclear origin, no clear inciting event, however patient has poor recall of the events leading to his hospitalization  -CK 74,000 on admission most recently 11,000  -Lower extremity venous duplex negative for DVT  -CT abdomen pelvis with significant subcutaneous edema throughout the left upper thigh with enlargement of all surrounding muscles  -Left leg appears increasingly swollen today, however patient denies any pain and reports improving paresthesias, leg compartments appear more firm than prior evaluations but is warm to touch with normal cap refill, I discussed this with Dr Betsy De Luna  -Management per primary team     Anemia  -Hemoglobin 10 3 from 12 7 on admission after IV fluid administration  -Recommend transfuse goal greater than 7 per primary team     Hyponatremia (resolved)  -Sodium 128 on admission, most recently 140  -Suspect second to SUSANA/impaired free water excretion  -Now resolved after IV fluid administration     Transaminitis  -Likely secondary to rhabdomyolysis     Additional Medical Problems: Substance abuse    SUBJECTIVE:  Reports he feels his left leg is more swollen than the day prior, denies left leg pain or worsening paresthesias, reports he feels his left leg numbness and tingling has improved, reports he is able to ambulate better than days prior, reports appetite is at baseline denies dysuria    ROS:  Review of Systems   Constitutional: Negative for fatigue  Respiratory: Negative  Cardiovascular: Positive for leg swelling  Gastrointestinal: Negative  Genitourinary: Negative  Musculoskeletal: Negative  Neurological: Negative  A complete 10 point review of systems was performed and found to be negative unless otherwise noted above or in the HPI  OBJECTIVE:  Current Weight: Weight - Scale: 99 4 kg (219 lb 2 2 oz)  Vitals:    12/15/22 0743 12/15/22 2127 12/16/22 0600 12/16/22 0757   BP: 148/86 153/81  153/85   BP Location: Right arm Left arm     Pulse: 69 67  70   Resp: 20 18  16   Temp: 98 1 °F (36 7 °C) 98 5 °F (36 9 °C)  97 7 °F (36 5 °C)   TempSrc: Oral Oral     SpO2: 99% 100%  97%   Weight:   99 4 kg (219 lb 2 2 oz)    Height:           Intake/Output Summary (Last 24 hours) at 12/16/2022 1047  Last data filed at 12/16/2022 1031  Gross per 24 hour   Intake 1000 ml   Output 475 ml   Net 525 ml     Physical Exam  Vitals and nursing note reviewed  Constitutional:       General: He is not in acute distress  Appearance: Normal appearance  He is not toxic-appearing or diaphoretic  HENT:      Head: Normocephalic  Comments: Right forehead abrasion     Nose: Nose normal       Mouth/Throat:      Mouth: Mucous membranes are moist    Eyes:      General: No scleral icterus  Cardiovascular:      Rate and Rhythm: Normal rate and regular rhythm  Pulses: Normal pulses  Pulmonary:      Effort: Pulmonary effort is normal  No respiratory distress  Breath sounds: Normal breath sounds  No wheezing or rales  Abdominal:      General: Abdomen is flat  There is no distension  Palpations: Abdomen is soft  Musculoskeletal:      Cervical back: Neck supple  Right lower leg: No edema  Left lower leg: Edema present  Skin:     General: Skin is warm and dry  Capillary Refill: Capillary refill takes less than 2 seconds  Neurological:      General: No focal deficit present  Mental Status: He is alert and oriented to person, place, and time     Psychiatric:         Mood and Affect: Mood normal           Medications:    Current Facility-Administered Medications:   •  heparin (porcine) subcutaneous injection 5,000 Units, 5,000 Units, Subcutaneous, Q8H [facility] & assisted, 5,000 Units at 12/16/22 0503 **AND** [COMPLETED] Platelet count, , , Once, Kirsty Watters MD  •  HYDROmorphone (DILAUDID) injection 0 5 mg, 0 5 mg, Intravenous, Q6H PRN, Kirsty Watters MD  •  melatonin tablet 3 mg, 3 mg, Oral, HS, Anabell Montano, PA-C, 3 mg at 12/15/22 2202  •  multi-electrolyte (PLASMALYTE-A/ISOLYTE-S PH 7 4) IV solution, 125 mL/hr, Intravenous, Continuous, Noe Hastings MD, Last Rate: 125 mL/hr at 12/16/22 1031, 125 mL/hr at 12/16/22 1031  •  nicotine (NICODERM CQ) 14 mg/24hr TD 24 hr patch 1 patch, 1 patch, Transdermal, Daily, Kirsty Watters MD  •  oxyCODONE (ROXICODONE) immediate release tablet 10 mg, 10 mg, Oral, Q6H PRN, Kirsty Watters MD, 10 mg at 12/16/22 8888  •  oxyCODONE (ROXICODONE) IR tablet 5 mg, 5 mg, Oral, Q6H PRN, Nicholas Hernández MD    Laboratory Results:  Results from last 7 days   Lab Units 12/16/22  0457 12/15/22  0529 12/14/22  0528 12/13/22  0614 12/12/22  1659 12/12/22  1208   WBC Thousand/uL  --   --  7 79 9 04  --  10 90*   HEMOGLOBIN g/dL  --   --  10 3* 10 8*  --  12 7   HEMATOCRIT %  --   --  29 9* 31 6*  --  37 4   PLATELETS Thousands/uL  --   --  259 279 285 305   POTASSIUM mmol/L 4 1 4 0 3 7 4 6  --  5 1   CHLORIDE mmol/L 99 99 96 98  --  94*   CO2 mmol/L 27 26 24 17*  --  20*   BUN mg/dL 97* 104* 106* 96*  --  86*   CREATININE mg/dL 7 49* 8 31* 8 44* 7 58*  --  6 46*   CALCIUM mg/dL 8 5 8 6 8 0* 7 9*  --  8 3   MAGNESIUM mg/dL 2 4  --   --   --   --   --        I have personally reviewed the blood work as stated above and in my note  I have personally reviewed internal medicine note

## 2022-12-17 LAB
ALBUMIN SERPL BCP-MCNC: 2.4 G/DL (ref 3.5–5)
ALP SERPL-CCNC: 47 U/L (ref 46–116)
ALT SERPL W P-5'-P-CCNC: 212 U/L (ref 12–78)
ANION GAP SERPL CALCULATED.3IONS-SCNC: 13 MMOL/L (ref 4–13)
AST SERPL W P-5'-P-CCNC: 186 U/L (ref 5–45)
BILIRUB SERPL-MCNC: 0.33 MG/DL (ref 0.2–1)
BUN SERPL-MCNC: 94 MG/DL (ref 5–25)
CALCIUM ALBUM COR SERPL-MCNC: 10 MG/DL (ref 8.3–10.1)
CALCIUM SERPL-MCNC: 8.7 MG/DL (ref 8.3–10.1)
CHLORIDE SERPL-SCNC: 103 MMOL/L (ref 96–108)
CK MB SERPL-MCNC: 5.3 NG/ML (ref 0–5)
CK MB SERPL-MCNC: <1 % (ref 0–2.5)
CK SERPL-CCNC: 6099 U/L (ref 39–308)
CO2 SERPL-SCNC: 26 MMOL/L (ref 21–32)
CREAT SERPL-MCNC: 6.58 MG/DL (ref 0.6–1.3)
GFR SERPL CREATININE-BSD FRML MDRD: 10 ML/MIN/1.73SQ M
GLUCOSE SERPL-MCNC: 106 MG/DL (ref 65–140)
HAV IGM SER QL: ABNORMAL
HBV CORE IGM SER QL: ABNORMAL
HBV SURFACE AG SER QL: ABNORMAL
HCV AB SER QL: ABNORMAL
POTASSIUM SERPL-SCNC: 4 MMOL/L (ref 3.5–5.3)
PROT SERPL-MCNC: 5.7 G/DL (ref 6.4–8.4)
SODIUM SERPL-SCNC: 142 MMOL/L (ref 135–147)

## 2022-12-17 RX ADMIN — SODIUM CHLORIDE, SODIUM GLUCONATE, SODIUM ACETATE, POTASSIUM CHLORIDE, MAGNESIUM CHLORIDE, SODIUM PHOSPHATE, DIBASIC, AND POTASSIUM PHOSPHATE 75 ML/HR: .53; .5; .37; .037; .03; .012; .00082 INJECTION, SOLUTION INTRAVENOUS at 14:32

## 2022-12-17 RX ADMIN — HEPARIN SODIUM 5000 UNITS: 5000 INJECTION INTRAVENOUS; SUBCUTANEOUS at 06:34

## 2022-12-17 RX ADMIN — HEPARIN SODIUM 5000 UNITS: 5000 INJECTION INTRAVENOUS; SUBCUTANEOUS at 14:32

## 2022-12-17 RX ADMIN — HEPARIN SODIUM 5000 UNITS: 5000 INJECTION INTRAVENOUS; SUBCUTANEOUS at 21:25

## 2022-12-17 RX ADMIN — Medication 3 MG: at 21:24

## 2022-12-17 NOTE — ASSESSMENT & PLAN NOTE
Stable  Possibly a component of dilutional anemia as a result of aggressive IV fluid hydration  · No evidence of acute bleeding at this time  No indication for transfusion

## 2022-12-17 NOTE — ASSESSMENT & PLAN NOTE
Due to rhabdomyolysis      · Acute hepatitis panel for completion, results pending  · Right upper quadrant ultrasound showing no acute findings    Recent Labs     12/16/22  0457 12/17/22  0504   * 186*   * 212*   TBILI 0 35 0 33   ALKPHOS 47 47

## 2022-12-17 NOTE — ASSESSMENT & PLAN NOTE
Secondary to rhabdomyolysis  · Treatment plan as written above      Recent Labs     12/15/22  0529 12/16/22  0457 12/17/22  0504   * 97* 94*   CREATININE 8 31* 7 49* 6 58*   EGFR 7 8 10       Results from last 7 days   Lab Units 12/12/22  1345   BLOOD UA  Large*   PROTEIN UA mg/dl 100 (2+)*

## 2022-12-17 NOTE — PLAN OF CARE
Problem: PAIN - ADULT  Goal: Verbalizes/displays adequate comfort level or baseline comfort level  Description: Interventions:  - Encourage patient to monitor pain and request assistance  - Assess pain using appropriate pain scale  - Administer analgesics based on type and severity of pain and evaluate response  - Implement non-pharmacological measures as appropriate and evaluate response  - Consider cultural and social influences on pain and pain management  - Notify physician/advanced practitioner if interventions unsuccessful or patient reports new pain  Outcome: Progressing     Problem: INFECTION - ADULT  Goal: Absence or prevention of progression during hospitalization  Description: INTERVENTIONS:  - Assess and monitor for signs and symptoms of infection  - Monitor lab/diagnostic results  - Monitor all insertion sites, i e  indwelling lines, tubes, and drains  - Monitor endotracheal if appropriate and nasal secretions for changes in amount and color  - [place] appropriate cooling/warming therapies per order  - Administer medications as ordered  - Instruct and encourage patient and family to use good hand hygiene technique  - Identify and instruct in appropriate isolation precautions for identified infection/condition  Outcome: Progressing     Problem: SAFETY ADULT  Goal: Patient will remain free of falls  Description: INTERVENTIONS:  - Educate patient/family on patient safety including physical limitations  - Instruct patient to call for assistance with activity   - Consult OT/PT to assist with strengthening/mobility   - Keep Call bell within reach  - Keep bed low and locked with side rails adjusted as appropriate  - Keep care items and personal belongings within reach  - Initiate and maintain comfort rounds  - Apply yellow socks and bracelet for high fall risk patients  - Consider moving patient to room near nurses station  Outcome: Progressing  Goal: Maintain or return to baseline ADL function  Description: INTERVENTIONS:  -  Assess patient's ability to carry out ADLs; assess patient's baseline for ADL function and identify physical deficits which impact ability to perform ADLs (bathing, care of mouth/teeth, toileting, grooming, dressing, etc )  - Assess/evaluate cause of self-care deficits   - Assess range of motion  - Assess patient's mobility; develop plan if impaired  - Assess patient's need for assistive devices and provide as appropriate  - Encourage maximum independence but intervene and supervise when necessary  - Involve family in performance of ADLs  - Assess for home care needs following discharge   - Consider OT consult to assist with ADL evaluation and planning for discharge  - Provide patient education as appropriate  Outcome: Progressing  Goal: Maintains/Returns to pre admission functional level  Description: INTERVENTIONS:  - Perform BMAT or MOVE assessment daily    - Set and communicate daily mobility goal to care team and patient/family/caregiver     - Collaborate with rehabilitation services on mobility goals if consulted  - Out of bed for meals 3 times a day  - Out of bed for toileting  - Record patient progress and toleration of activity level   Outcome: Progressing     Problem: DISCHARGE PLANNING  Goal: Discharge to home or other facility with appropriate resources  Description: INTERVENTIONS:  - Identify barriers to discharge w/patient and caregiver  - Arrange for needed discharge resources and transportation as appropriate  - Identify discharge learning needs (meds, wound care, etc )  - Arrange for interpretive services to assist at discharge as needed  - Refer to Case Management Department for coordinating discharge planning if the patient needs post-hospital services based on physician/advanced practitioner order or complex needs related to functional status, cognitive ability, or social support system  Outcome: Progressing     Problem: Knowledge Deficit  Goal: Patient/family/caregiver demonstrates understanding of disease process, treatment plan, medications, and discharge instructions  Description: Complete learning assessment and assess knowledge base    Interventions:  - Provide teaching at level of understanding  - Provide teaching via preferred learning methods  Outcome: Progressing     Problem: METABOLIC, FLUID AND ELECTROLYTES - ADULT  Goal: Electrolytes maintained within normal limits  Description: INTERVENTIONS:  - Monitor labs and assess patient for signs and symptoms of electrolyte imbalances  - Administer electrolyte replacement as ordered  - Monitor response to electrolyte replacements, including repeat lab results as appropriate  - Instruct patient on fluid and nutrition as appropriate  Outcome: Progressing  Goal: Fluid balance maintained  Description: INTERVENTIONS:  - Monitor labs   - Monitor I/O and WT  - Instruct patient on fluid and nutrition as appropriate  - Assess for signs & symptoms of volume excess or deficit  Outcome: Progressing     Problem: MOBILITY - ADULT  Goal: Maintain or return to baseline ADL function  Description: INTERVENTIONS:  -  Assess patient's ability to carry out ADLs; assess patient's baseline for ADL function and identify physical deficits which impact ability to perform ADLs (bathing, care of mouth/teeth, toileting, grooming, dressing, etc )  - Assess/evaluate cause of self-care deficits   - Assess range of motion  - Assess patient's mobility; develop plan if impaired  - Assess patient's need for assistive devices and provide as appropriate  - Encourage maximum independence but intervene and supervise when necessary  - Involve family in performance of ADLs  - Assess for home care needs following discharge   - Consider OT consult to assist with ADL evaluation and planning for discharge  - Provide patient education as appropriate  Outcome: Progressing  Goal: Maintains/Returns to pre admission functional level  Description: INTERVENTIONS:  - Perform BMAT or MOVE assessment daily    - Set and communicate daily mobility goal to care team and patient/family/caregiver     - Collaborate with rehabilitation services on mobility goals if consulted  - Out of bed for meals 3 times a day  - Out of bed for toileting  - Record patient progress and toleration of activity level   Outcome: Progressing     Problem: Potential for Falls  Goal: Patient will remain free of falls  Description: INTERVENTIONS:  - Educate patient/family on patient safety including physical limitations  - Instruct patient to call for assistance with activity   - Consult OT/PT to assist with strengthening/mobility   - Keep Call bell within reach  - Keep bed low and locked with side rails adjusted as appropriate  - Keep care items and personal belongings within reach  - Initiate and maintain comfort rounds  - Apply yellow socks and bracelet for high fall risk patients  - Consider moving patient to room near nurses station  Outcome: Progressing

## 2022-12-17 NOTE — PROGRESS NOTES
[facility]  Progress Note - Maple Alvarenga 1990, 27 y o  male MRN: [id number]  Unit/Bed#: [address] 87 203-01 Encounter: [id number]  Primary Care Provider: Coco Ely PA-C   Date and time admitted to hospital: 12/12/2022 11:12 AM    * Rhabdomyolysis  Assessment & Plan  78-year-old male presented to institution due to left lower extremity pain after he woke up from pain as a result of " overdoing it" in the setting of drug use  · CK levels continue to downtrend  · Acidosis resolved with bicarbonate drip  · Continues to remain on IV fluids  · Management per nephrology  Not stable for discharge yet  Awaiting renal recovery  Compliance to follow-up is uncertain  Would be best to optimize him further here  Recent Labs     12/15/22  0529 12/16/22  0457 12/17/22  0504   CKTOTAL 19,980* 11,117* 6,099*         Anemia  Assessment & Plan  Stable  Possibly a component of dilutional anemia as a result of aggressive IV fluid hydration  · No evidence of acute bleeding at this time  No indication for transfusion  Elevated LFTs  Assessment & Plan  Due to rhabdomyolysis  · Acute hepatitis panel for completion, results pending  · Right upper quadrant ultrasound showing no acute findings    Recent Labs     12/16/22 0457 12/17/22  0504   * 186*   * 212*   TBILI 0 35 0 33   ALKPHOS 47 47         Leg pain  Assessment & Plan  Doppler ultrasound negative for DVT  Suspect that this is due to post trauma which resulted in his rhabdomyolysis  Improving  Able to move toes, sensation intact    Substance abuse ([address] )  Assessment & Plan  History of IV drug abuse, UDS positive for cocaine  · Previously on Suboxone, has been off it for several days, will hold off at this time  · Continue to monitor for withdrawals    SUSANA (acute kidney injury) [facility])  Assessment & Plan  Secondary to rhabdomyolysis  · Treatment plan as written above      Recent Labs     12/15/22  0529 12/16/22  0457 22  0504   * 97* 94*   CREATININE 8 31* 7 49* 6 58*   EGFR 7 8 10       Results from last 7 days   Lab Units 22  1345   BLOOD UA  Large*   PROTEIN UA mg/dl 100 (2+)*         VTE Pharmacologic Prophylaxis:   Pharmacologic: Heparin  Mechanical VTE Prophylaxis in Place: Yes    Discussions with Specialists or Other Care Team Provider: nursing    Education and Discussions with Family / Patient: patient    Time Spent for Care: 30 minutes  More than 50% of total time spent on counseling and coordination of care as described above  Current Length of Stay: 5 day(s)    Current Patient Status: Inpatient   Certification Statement: The patient will continue to require additional inpatient hospital stay due to iv hydration, renal reeval, radha management    Discharge Plan: active    Code Status: Level 1 - Full Code      Subjective:   Patient seen and examined at bedside  I made him aware that he is required to stay in house longer as a result of his acute kidney injury  Objective:     Vitals:   Temp (24hrs), Av 1 °F (36 7 °C), Min:97 9 °F (36 6 °C), Max:98 3 °F (36 8 °C)    Temp:  [97 9 °F (36 6 °C)-98 3 °F (36 8 °C)] 98 3 °F (36 8 °C)  HR:  [70-79] 70  Resp:  [18-20] 20  BP: (140-163)/(75-84) 163/84  SpO2:  [96 %-99 %] 99 %  Body mass index is 33 99 kg/m²  Input and Output Summary (last 24 hours): Intake/Output Summary (Last 24 hours) at 2022 1258  Last data filed at 2022 2346  Gross per 24 hour   Intake 2340 ml   Output --   Net 2340 ml       Physical Exam:     Physical Exam  Vitals reviewed  HENT:      Head: Normocephalic  Nose: Nose normal       Mouth/Throat:      Mouth: Mucous membranes are moist    Eyes:      General: No scleral icterus  Cardiovascular:      Rate and Rhythm: Normal rate and regular rhythm  Pulmonary:      Effort: Pulmonary effort is normal  No respiratory distress  Abdominal:      General: There is no distension        Palpations: Abdomen is soft       Tenderness: There is no abdominal tenderness  Musculoskeletal:      Left lower leg: Edema present  Skin:     General: Skin is warm  Neurological:      Mental Status: He is alert and oriented to person, place, and time  Psychiatric:         Mood and Affect: Mood normal          Behavior: Behavior normal        Additional Data:     Labs:    Results from last 7 days   Lab Units 12/14/22  0528 12/12/22  1659 12/12/22  1208   WBC Thousand/uL 7 79   < > 10 90*   HEMOGLOBIN g/dL 10 3*   < > 12 7   HEMATOCRIT % 29 9*   < > 37 4   PLATELETS Thousands/uL 259   < > 305   NEUTROS PCT %  --   --  80*   LYMPHS PCT %  --   --  12*   MONOS PCT %  --   --  7   EOS PCT %  --   --  0    < > = values in this interval not displayed  Results from last 7 days   Lab Units 12/17/22  0504   SODIUM mmol/L 142   POTASSIUM mmol/L 4 0   CHLORIDE mmol/L 103   CO2 mmol/L 26   BUN mg/dL 94*   CREATININE mg/dL 6 58*   ANION GAP mmol/L 13   CALCIUM mg/dL 8 7   ALBUMIN g/dL 2 4*   TOTAL BILIRUBIN mg/dL 0 33   ALK PHOS U/L 47   ALT U/L 212*   AST U/L 186*   GLUCOSE RANDOM mg/dL 106     Results from last 7 days   Lab Units 12/14/22  0528   INR  1 06                       * I Have Reviewed All Lab Data Listed Above  * Additional Pertinent Lab Tests Reviewed:  [address] Admission Reviewed      Lines:  Invasive Devices     Peripheral Intravenous Line  Duration           Peripheral IV 12/16/22 Left;Proximal;Ventral (anterior) Forearm 1 day               Imaging:    Imaging Reports Reviewed Today Include: no new imaging    Recent Cultures (last 7 days):           Last 24 Hours Medication List:   Current Facility-Administered Medications   Medication Dose Route Frequency Provider Last Rate   • heparin (porcine)  5,000 Units Subcutaneous Q8H Reginald Block MD     • HYDROmorphone  0 5 mg Intravenous Q6H PRN Uzair Couch MD     • hydrOXYzine HCL  25 mg Oral Q6H PRN Anthony Curtis PA-C     • melatonin  3 mg Oral HS Ema Cole PA-C     • multi-electrolyte  75 mL/hr Intravenous Continuous ERNESTO Massey 75 mL/hr (12/16/22 8279)   • nicotine  1 patch Transdermal Daily Jossy Swartz MD     • oxyCODONE  10 mg Oral Q6H PRN Jossy Swartz MD     • oxyCODONE  5 mg Oral Q6H PRN Jossy Swartz MD          Today, Patient Was Seen By: Joan Guevara MD    ** Please Note: Dictation voice to text software may have been used in the creation of this document   **

## 2022-12-17 NOTE — PROGRESS NOTES
[address] [facility] NOTE   [facility] 27 y o  male MRN: [id number]  Unit/Bed#: [place] 2 -01 Encounter: [id number]  Reason for Consult: Acute kidney injury    ASSESSMENT and PLAN:  68-year-old with a past medical history of substance abuse, hepatitis C who presented with leg pain and decreased urination  Poor recall of events prior to hospitalization  Nephrology consulted for management of acute kidney injury  Acute kidney injury (POA):  · Etiology: Consistent with pigment injury due to rhabdomyolysis  · Toxicology screen positive for cocaine on admission  · Baseline creatinine: 0 8 to 0 9 mg/dL  · Admitted 12/12/2022 creatinine 6 46 with a peak creatinine of 8 44 on 12/14  · Initially treated with bicarbonate infusion due to metabolic acidosis and switched to Plasma-Lyte  · Currently on Plasma-Lyte 75 mL/h  · Renal function slowly improving, creatinine down to 6 58  No urine output documented  · Plan/recommendations:   · Continue IV fluids  · Avoid nephrotoxic agents, hypotension  · Monitor JOANNA    Rhabdomyolysis/left leg pain:  · Unclear origin although patient does not recall events leading to hospitalization  · CK 74,000 on admission  · Lower extremity Doppler negative for DVT  · CT: Significant subcu edema through the left upper thigh with enlargement of all surrounding muscles  · 12/16: CK down to 11,117  · 12/17: CK 6099  · Slowly improving, continue Plasma-Lyte 75 mL/h    Anemia:  · Last hemoglobin 10 3, monitor  · Per primary team    Transaminitis:   · In the setting of rhabdomyolysis  · Liver enzymes decreasing    Other: Substance abuse      SUBJECTIVE / 24H INTERVAL HISTORY:  No overnight events reported  Explained to him when he was seen that we were waiting for a m  labs      OBJECTIVE:  Current Weight: Weight - Scale: 101 kg (223 lb 8 7 oz)  Vitals:    12/16/22 0757 12/16/22 1532 12/16/22 2149 12/17/22 0600   BP: 153/85 140/75 142/76    BP Location:       Pulse: 70 76 79    Resp: 16  18    Temp: 97 7 °F (36 5 °C) 98 2 °F (36 8 °C) 97 9 °F (36 6 °C)    TempSrc:       SpO2: 97% 99% 96%    Weight:    101 kg (223 lb 8 7 oz)   Height:           Intake/Output Summary (Last 24 hours) at 12/17/2022 2436  Last data filed at 12/16/2022 2346  Gross per 24 hour   Intake 2039 58 ml   Output --   Net 2039 58 ml     General: NAD, comfortably lying in bed resting  Skin: no rash  Eyes: anicteric sclera  ENT: moist mucous membrane  Neck: supple  Chest: CTA b/l, no ronchii, no wheeze, no rubs, no rales  Normal effort  CVS: s1s2, no murmur, no gallop, no rub  Normal rate regular rhythm  Abdomen: soft, nontender, nl sounds  Extremities: Left lower extremity edema    : no lares  Neuro: AAOX3  Psych: normal affect  Medications:    Current Facility-Administered Medications:   •  heparin (porcine) subcutaneous injection 5,000 Units, 5,000 Units, Subcutaneous, Q8H [address], 5,000 Units at 12/17/22 0634 **AND** [COMPLETED] Platelet count, , , Once, Chen Adkins MD  •  HYDROmorphone (DILAUDID) injection 0 5 mg, 0 5 mg, Intravenous, Q6H PRN, Chen Adkins MD  •  hydrOXYzine HCL (ATARAX) tablet 25 mg, 25 mg, Oral, Q6H PRN, Ling Ivette, PA-C, 25 mg at 12/16/22 2354  •  melatonin tablet 3 mg, 3 mg, Oral, HS, Ilng Poplin, PA-C, 3 mg at 12/16/22 2147  •  multi-electrolyte (PLASMALYTE-A/ISOLYTE-S PH 7 4) IV solution, 75 mL/hr, Intravenous, Continuous, Leafy Wilkinson, CRNP, Last Rate: 75 mL/hr at 12/16/22 2347, 75 mL/hr at 12/16/22 2347  •  nicotine (NICODERM CQ) 14 mg/24hr TD 24 hr patch 1 patch, 1 patch, Transdermal, Daily, Chen Adkins MD  •  oxyCODONE (ROXICODONE) immediate release tablet 10 mg, 10 mg, Oral, Q6H PRN, Chen Adkins MD, 10 mg at 12/16/22 0152  •  oxyCODONE (ROXICODONE) IR tablet 5 mg, 5 mg, Oral, Q6H PRN, Chen Adkins MD    Laboratory Results:  Results from last 7 days   Lab Units 12/16/22  0457 12/15/22  0529 12/14/22  0528 12/13/22  0614 12/12/22  1659 12/12/22  1208   WBC Thousand/uL  -- --  7 79 9 04  --  10 90*   HEMOGLOBIN g/dL  --   --  10 3* 10 8*  --  12 7   HEMATOCRIT %  --   --  29 9* 31 6*  --  37 4   PLATELETS Thousands/uL  --   --  259 279 285 305   POTASSIUM mmol/L 4 1 4 0 3 7 4 6  --  5 1   CHLORIDE mmol/L 99 99 96 98  --  94*   CO2 mmol/L 27 26 24 17*  --  20*   BUN mg/dL 97* 104* 106* 96*  --  86*   CREATININE mg/dL 7 49* 8 31* 8 44* 7 58*  --  6 46*   CALCIUM mg/dL 8 5 8 6 8 0* 7 9*  --  8 3   MAGNESIUM mg/dL 2 4  --   --   --   --   --

## 2022-12-17 NOTE — PLAN OF CARE
Problem: PAIN - ADULT  Goal: Verbalizes/displays adequate comfort level or baseline comfort level  Description: Interventions:  - Encourage patient to monitor pain and request assistance  - Assess pain using appropriate pain scale  - Administer analgesics based on type and severity of pain and evaluate response  - Implement non-pharmacological measures as appropriate and evaluate response  - Consider cultural and social influences on pain and pain management  - Notify physician/advanced practitioner if interventions unsuccessful or patient reports new pain  Outcome: Progressing     Problem: INFECTION - ADULT  Goal: Absence or prevention of progression during hospitalization  Description: INTERVENTIONS:  - Assess and monitor for signs and symptoms of infection  - Monitor lab/diagnostic results  - Monitor all insertion sites, i e  indwelling lines, tubes, and drains  - Monitor endotracheal if appropriate and nasal secretions for changes in amount and color  - [place] appropriate cooling/warming therapies per order  - Administer medications as ordered  - Instruct and encourage patient and family to use good hand hygiene technique  - Identify and instruct in appropriate isolation precautions for identified infection/condition  Outcome: Progressing     Problem: SAFETY ADULT  Goal: Patient will remain free of falls  Description: INTERVENTIONS:  - Educate patient/family on patient safety including physical limitations  - Instruct patient to call for assistance with activity   - Consult OT/PT to assist with strengthening/mobility   - Keep Call bell within reach  - Keep bed low and locked with side rails adjusted as appropriate  - Keep care items and personal belongings within reach  - Initiate and maintain comfort rounds  - Apply yellow socks and bracelet for high fall risk patients  - Consider moving patient to room near nurses station  Outcome: Progressing  Goal: Maintain or return to baseline ADL function  Description: INTERVENTIONS:  -  Assess patient's ability to carry out ADLs; assess patient's baseline for ADL function and identify physical deficits which impact ability to perform ADLs (bathing, care of mouth/teeth, toileting, grooming, dressing, etc )  - Assess/evaluate cause of self-care deficits   - Assess range of motion  - Assess patient's mobility; develop plan if impaired  - Assess patient's need for assistive devices and provide as appropriate  - Encourage maximum independence but intervene and supervise when necessary  - Involve family in performance of ADLs  - Assess for home care needs following discharge   - Consider OT consult to assist with ADL evaluation and planning for discharge  - Provide patient education as appropriate  Outcome: Progressing  Goal: Maintains/Returns to pre admission functional level  Description: INTERVENTIONS:  - Perform BMAT or MOVE assessment daily    - Set and communicate daily mobility goal to care team and patient/family/caregiver     - Collaborate with rehabilitation services on mobility goals if consulted  - Out of bed for meals 3 times a day  - Out of bed for toileting  - Record patient progress and toleration of activity level   Outcome: Progressing     Problem: DISCHARGE PLANNING  Goal: Discharge to home or other facility with appropriate resources  Description: INTERVENTIONS:  - Identify barriers to discharge w/patient and caregiver  - Arrange for needed discharge resources and transportation as appropriate  - Identify discharge learning needs (meds, wound care, etc )  - Arrange for interpretive services to assist at discharge as needed  - Refer to Case Management Department for coordinating discharge planning if the patient needs post-hospital services based on physician/advanced practitioner order or complex needs related to functional status, cognitive ability, or social support system  Outcome: Progressing     Problem: Knowledge Deficit  Goal: Patient/family/caregiver demonstrates understanding of disease process, treatment plan, medications, and discharge instructions  Description: Complete learning assessment and assess knowledge base    Interventions:  - Provide teaching at level of understanding  - Provide teaching via preferred learning methods  Outcome: Progressing     Problem: METABOLIC, FLUID AND ELECTROLYTES - ADULT  Goal: Electrolytes maintained within normal limits  Description: INTERVENTIONS:  - Monitor labs and assess patient for signs and symptoms of electrolyte imbalances  - Administer electrolyte replacement as ordered  - Monitor response to electrolyte replacements, including repeat lab results as appropriate  - Instruct patient on fluid and nutrition as appropriate  Outcome: Progressing  Goal: Fluid balance maintained  Description: INTERVENTIONS:  - Monitor labs   - Monitor I/O and WT  - Instruct patient on fluid and nutrition as appropriate  - Assess for signs & symptoms of volume excess or deficit  Outcome: Progressing     Problem: MOBILITY - ADULT  Goal: Maintain or return to baseline ADL function  Description: INTERVENTIONS:  -  Assess patient's ability to carry out ADLs; assess patient's baseline for ADL function and identify physical deficits which impact ability to perform ADLs (bathing, care of mouth/teeth, toileting, grooming, dressing, etc )  - Assess/evaluate cause of self-care deficits   - Assess range of motion  - Assess patient's mobility; develop plan if impaired  - Assess patient's need for assistive devices and provide as appropriate  - Encourage maximum independence but intervene and supervise when necessary  - Involve family in performance of ADLs  - Assess for home care needs following discharge   - Consider OT consult to assist with ADL evaluation and planning for discharge  - Provide patient education as appropriate  Outcome: Progressing  Goal: Maintains/Returns to pre admission functional level  Description: INTERVENTIONS:  - Perform BMAT or MOVE assessment daily    - Set and communicate daily mobility goal to care team and patient/family/caregiver     - Collaborate with rehabilitation services on mobility goals if consulted  - Out of bed for meals 3 times a day  - Out of bed for toileting  - Record patient progress and toleration of activity level   Outcome: Progressing     Problem: Potential for Falls  Goal: Patient will remain free of falls  Description: INTERVENTIONS:  - Educate patient/family on patient safety including physical limitations  - Instruct patient to call for assistance with activity   - Consult OT/PT to assist with strengthening/mobility   - Keep Call bell within reach  - Keep bed low and locked with side rails adjusted as appropriate  - Keep care items and personal belongings within reach  - Initiate and maintain comfort rounds  - Apply yellow socks and bracelet for high fall risk patients  - Consider moving patient to room near nurses station  Outcome: Progressing

## 2022-12-17 NOTE — ASSESSMENT & PLAN NOTE
26-year-old male presented to institution due to left lower extremity pain after he woke up from pain as a result of " overdoing it" in the setting of drug use  · CK levels continue to downtrend  · Acidosis resolved with bicarbonate drip  · Continues to remain on IV fluids  · Management per nephrology  Not stable for discharge yet  Awaiting renal recovery  Compliance to follow-up is uncertain  Would be best to optimize him further here      Recent Labs     12/15/22  0529 12/16/22  0457 12/17/22  0504   CKTOTAL 19,980* 11,117* 6,099*

## 2022-12-18 LAB
ALBUMIN SERPL BCP-MCNC: 2.3 G/DL (ref 3.5–5)
ALP SERPL-CCNC: 43 U/L (ref 46–116)
ALT SERPL W P-5'-P-CCNC: 170 U/L (ref 12–78)
ANION GAP SERPL CALCULATED.3IONS-SCNC: 11 MMOL/L (ref 4–13)
AST SERPL W P-5'-P-CCNC: 122 U/L (ref 5–45)
BILIRUB SERPL-MCNC: 0.27 MG/DL (ref 0.2–1)
BUN SERPL-MCNC: 82 MG/DL (ref 5–25)
CALCIUM ALBUM COR SERPL-MCNC: 9.8 MG/DL (ref 8.3–10.1)
CALCIUM SERPL-MCNC: 8.4 MG/DL (ref 8.3–10.1)
CHLORIDE SERPL-SCNC: 105 MMOL/L (ref 96–108)
CO2 SERPL-SCNC: 26 MMOL/L (ref 21–32)
CREAT SERPL-MCNC: 5.55 MG/DL (ref 0.6–1.3)
GFR SERPL CREATININE-BSD FRML MDRD: 12 ML/MIN/1.73SQ M
GLUCOSE SERPL-MCNC: 94 MG/DL (ref 65–140)
POTASSIUM SERPL-SCNC: 4.4 MMOL/L (ref 3.5–5.3)
PROT SERPL-MCNC: 5.4 G/DL (ref 6.4–8.4)
SODIUM SERPL-SCNC: 142 MMOL/L (ref 135–147)

## 2022-12-18 RX ORDER — LORAZEPAM 2 MG/ML
1 INJECTION INTRAMUSCULAR ONCE
Status: COMPLETED | OUTPATIENT
Start: 2022-12-18 | End: 2022-12-18

## 2022-12-18 RX ADMIN — OXYCODONE HYDROCHLORIDE 5 MG: 5 TABLET ORAL at 04:11

## 2022-12-18 RX ADMIN — HYDROXYZINE HYDROCHLORIDE 25 MG: 25 TABLET ORAL at 22:27

## 2022-12-18 RX ADMIN — OXYCODONE HYDROCHLORIDE 10 MG: 10 TABLET ORAL at 22:27

## 2022-12-18 RX ADMIN — Medication 3 MG: at 22:27

## 2022-12-18 RX ADMIN — HEPARIN SODIUM 5000 UNITS: 5000 INJECTION INTRAVENOUS; SUBCUTANEOUS at 05:57

## 2022-12-18 RX ADMIN — HEPARIN SODIUM 5000 UNITS: 5000 INJECTION INTRAVENOUS; SUBCUTANEOUS at 14:20

## 2022-12-18 RX ADMIN — HEPARIN SODIUM 5000 UNITS: 5000 INJECTION INTRAVENOUS; SUBCUTANEOUS at 22:27

## 2022-12-18 RX ADMIN — SODIUM CHLORIDE, SODIUM GLUCONATE, SODIUM ACETATE, POTASSIUM CHLORIDE, MAGNESIUM CHLORIDE, SODIUM PHOSPHATE, DIBASIC, AND POTASSIUM PHOSPHATE 75 ML/HR: .53; .5; .37; .037; .03; .012; .00082 INJECTION, SOLUTION INTRAVENOUS at 04:08

## 2022-12-18 RX ADMIN — LORAZEPAM 1 MG: 2 INJECTION INTRAMUSCULAR; INTRAVENOUS at 15:21

## 2022-12-18 NOTE — PROGRESS NOTES
[facility]  Progress Note - Mallory Lockhart 1990, 27 y o  male MRN: [id number]  Unit/Bed#: [address] 87 203-01 Encounter: [id number]  Primary Care Provider: Jarrett Hernandez PA-C   Date and time admitted to hospital: 12/12/2022 11:12 AM    * Rhabdomyolysis  Assessment & Plan  26-year-old male presented to institution due to left lower extremity pain after he woke up from pain as a result of " overdoing it" in the setting of drug use  · CK levels continue to downtrend  · Acidosis resolved with bicarbonate drip  · Continues to remain on IV fluids  · Management per nephrology  Not stable for discharge yet  · Awaiting renal recovery  Compliance to follow-up is uncertain  Would be best to optimize him further here  Recent Labs     12/16/22 0457 12/17/22  0504   CKTOTAL 11,117* 6,099*         Anemia  Assessment & Plan  Stable  Possibly a component of dilutional anemia as a result of aggressive IV fluid hydration  · No evidence of acute bleeding at this time  No indication for transfusion  Elevated LFTs  Assessment & Plan  Due to rhabdomyolysis  · Acute hepatitis panel for completion, results pending  · Right upper quadrant ultrasound showing no acute findings    Recent Labs     12/16/22 0457 12/17/22  0504 12/18/22  0525   * 186* 122*   * 212* 170*   TBILI 0 35 0 33 0 27   ALKPHOS 47 47 43*         Leg pain  Assessment & Plan  Doppler ultrasound negative for DVT  Suspect that this is due to post trauma which resulted in his rhabdomyolysis  Improving, but still swollen  Able to move toes, sensation intact    Substance abuse ([facility] [address] )  Assessment & Plan  History of IV drug abuse, UDS positive for cocaine  · Previously on Suboxone, has been off it for several days, will hold off at this time  · Continue to monitor for withdrawals    SUSANA (acute kidney injury) [facility])  Assessment & Plan  Secondary to rhabdomyolysis in the setting of drug abuse   Peak creatinine of 8 44   · Treatment plan as written above  Recent Labs     22  0457 22  0504 22  0525   BUN 97* 94* 82*   CREATININE 7 49* 6 58* 5 55*   EGFR 8 10 12       Results from last 7 days   Lab Units 22  1345   BLOOD UA  Large*   PROTEIN UA mg/dl 100 (2+)*         VTE Pharmacologic Prophylaxis:   Pharmacologic: Heparin  Mechanical VTE Prophylaxis in Place: Yes    Discussions with Specialists or Other Care Team Provider: nursing    Education and Discussions with Family / Patient: patient    Time Spent for Care: 30 minutes  More than 50% of total time spent on counseling and coordination of care as described above  Current Length of Stay: 6 day(s)    Current Patient Status: Inpatient   Certification Statement: The patient will continue to require additional inpatient hospital stay due to iv hydration, radha, nephrology reevaluation    Discharge Plan: active    Code Status: Level 1 - Full Code      Subjective:   Patient seen and examined at bedside  Comfortable  No new complaints overnight  Objective:     Vitals:   Temp (24hrs), Av 7 °F (36 5 °C), Min:97 °F (36 1 °C), Max:98 3 °F (36 8 °C)    Temp:  [97 °F (36 1 °C)-98 3 °F (36 8 °C)] 97 °F (36 1 °C)  HR:  [78-82] 78  Resp:  [18] 18  BP: (133-145)/(71-97) 145/97  SpO2:  [96 %-100 %] 96 %  Body mass index is 34 66 kg/m²  Input and Output Summary (last 24 hours): Intake/Output Summary (Last 24 hours) at 2022 1249  Last data filed at 2022 0910  Gross per 24 hour   Intake 2846 25 ml   Output --   Net 2846 25 ml       Physical Exam:     Physical Exam  Vitals reviewed  HENT:      Head: Normocephalic  Nose: Nose normal       Mouth/Throat:      Mouth: Mucous membranes are moist    Eyes:      General: No scleral icterus  Cardiovascular:      Rate and Rhythm: Normal rate and regular rhythm  Pulmonary:      Effort: Pulmonary effort is normal  No respiratory distress  Breath sounds: No wheezing     Abdominal: General: There is no distension  Palpations: Abdomen is soft  Tenderness: There is no abdominal tenderness  Musculoskeletal:      Comments: LLE swelling, no tenderness to palpation  Able to move extremities without issue   Skin:     General: Skin is warm  Neurological:      Mental Status: He is alert and oriented to person, place, and time  Psychiatric:         Mood and Affect: Mood normal          Behavior: Behavior normal        Additional Data:     Labs:    Results from last 7 days   Lab Units 12/14/22  0528 12/12/22  1659 12/12/22  1208   WBC Thousand/uL 7 79   < > 10 90*   HEMOGLOBIN g/dL 10 3*   < > 12 7   HEMATOCRIT % 29 9*   < > 37 4   PLATELETS Thousands/uL 259   < > 305   NEUTROS PCT %  --   --  80*   LYMPHS PCT %  --   --  12*   MONOS PCT %  --   --  7   EOS PCT %  --   --  0    < > = values in this interval not displayed  Results from last 7 days   Lab Units 12/18/22  0525   SODIUM mmol/L 142   POTASSIUM mmol/L 4 4   CHLORIDE mmol/L 105   CO2 mmol/L 26   BUN mg/dL 82*   CREATININE mg/dL 5 55*   ANION GAP mmol/L 11   CALCIUM mg/dL 8 4   ALBUMIN g/dL 2 3*   TOTAL BILIRUBIN mg/dL 0 27   ALK PHOS U/L 43*   ALT U/L 170*   AST U/L 122*   GLUCOSE RANDOM mg/dL 94     Results from last 7 days   Lab Units 12/14/22  0528   INR  1 06                       * I Have Reviewed All Lab Data Listed Above  * Additional Pertinent Lab Tests Reviewed:  [address] [address] Admission Reviewed      Lines:  Invasive Devices     Peripheral Intravenous Line  Duration           Peripheral IV 12/16/22 Left;Proximal;Ventral (anterior) Forearm 2 days               Imaging:    Imaging Reports Reviewed Today Include: no new imaging    Recent Cultures (last 7 days):           Last 24 Hours Medication List:   Current Facility-Administered Medications   Medication Dose Route Frequency Provider Last Rate   • heparin (porcine)  5,000 Units Subcutaneous Q8H 150 VODECLIC Fam Mata MD     • HYDROmorphone  0 5 mg Intravenous Q6H PRN Kirsty Watters MD     • hydrOXYzine HCL  25 mg Oral Q6H PRN Anabell Montano PA-C     • melatonin  3 mg Oral HS Anabell Montano PA-C     • multi-electrolyte  75 mL/hr Intravenous Continuous Poppy Cari, CRNP 75 mL/hr (12/18/22 0408)   • nicotine  1 patch Transdermal Daily Kirsty Watters MD     • oxyCODONE  10 mg Oral Q6H PRN Kirsty Watters MD     • oxyCODONE  5 mg Oral Q6H PRN Kirsty Watters MD          Today, Patient Was Seen By: Adan Sung MD    ** Please Note: Dictation voice to text software may have been used in the creation of this document   **

## 2022-12-18 NOTE — ASSESSMENT & PLAN NOTE
38-year-old male presented to institution due to left lower extremity pain after he woke up from pain as a result of " overdoing it" in the setting of drug use  · CK levels continue to downtrend  · Acidosis resolved with bicarbonate drip  · Continues to remain on IV fluids  · Management per nephrology  Not stable for discharge yet  · Awaiting renal recovery  Compliance to follow-up is uncertain  Would be best to optimize him further here      Recent Labs     12/16/22  0457 12/17/22  0504   CKTOTAL 11,117* 6,099*

## 2022-12-18 NOTE — QUICK NOTE
Patient initially requested AMA  I explained to him that he is improving at this point, but has not been cleared for discharge yet by renal  I told him that his risk for worsening kidney failure may lead to dialysis, poor quality of life, and even death  He states that "I just can't stand being kept in  I F**d multiple times went to custodial, rehab, and now this " I told him that by leaving today, this releases us from liability and he is likely going to make a poor decision again  He was agreeable to remain in house and requested that we give him something to get the edge off  Ativan IV ordered

## 2022-12-18 NOTE — ASSESSMENT & PLAN NOTE
Due to rhabdomyolysis      · Acute hepatitis panel for completion, results pending  · Right upper quadrant ultrasound showing no acute findings    Recent Labs     12/16/22  0457 12/17/22  0504 12/18/22  0525   * 186* 122*   * 212* 170*   TBILI 0 35 0 33 0 27   ALKPHOS 47 47 43*

## 2022-12-18 NOTE — PLAN OF CARE
Problem: PAIN - ADULT  Goal: Verbalizes/displays adequate comfort level or baseline comfort level  Description: Interventions:  - Encourage patient to monitor pain and request assistance  - Assess pain using appropriate pain scale  - Administer analgesics based on type and severity of pain and evaluate response  - Implement non-pharmacological measures as appropriate and evaluate response  - Consider cultural and social influences on pain and pain management  - Notify physician/advanced practitioner if interventions unsuccessful or patient reports new pain  Outcome: Progressing     Problem: INFECTION - ADULT  Goal: Absence or prevention of progression during hospitalization  Description: INTERVENTIONS:  - Assess and monitor for signs and symptoms of infection  - Monitor lab/diagnostic results  - Monitor all insertion sites, i e  indwelling lines, tubes, and drains  - Monitor endotracheal if appropriate and nasal secretions for changes in amount and color  - [place] appropriate cooling/warming therapies per order  - Administer medications as ordered  - Instruct and encourage patient and family to use good hand hygiene technique  - Identify and instruct in appropriate isolation precautions for identified infection/condition  Outcome: Progressing     Problem: SAFETY ADULT  Goal: Patient will remain free of falls  Description: INTERVENTIONS:  - Educate patient/family on patient safety including physical limitations  - Instruct patient to call for assistance with activity   - Consult OT/PT to assist with strengthening/mobility   - Keep Call bell within reach  - Keep bed low and locked with side rails adjusted as appropriate  - Keep care items and personal belongings within reach  - Initiate and maintain comfort rounds  - Apply yellow socks and bracelet for high fall risk patients  - Consider moving patient to room near nurses station  Outcome: Progressing  Goal: Maintain or return to baseline ADL function  Description: INTERVENTIONS:  -  Assess patient's ability to carry out ADLs; assess patient's baseline for ADL function and identify physical deficits which impact ability to perform ADLs (bathing, care of mouth/teeth, toileting, grooming, dressing, etc )  - Assess/evaluate cause of self-care deficits   - Assess range of motion  - Assess patient's mobility; develop plan if impaired  - Assess patient's need for assistive devices and provide as appropriate  - Encourage maximum independence but intervene and supervise when necessary  - Involve family in performance of ADLs  - Assess for home care needs following discharge   - Consider OT consult to assist with ADL evaluation and planning for discharge  - Provide patient education as appropriate  Outcome: Progressing  Goal: Maintains/Returns to pre admission functional level  Description: INTERVENTIONS:  - Perform BMAT or MOVE assessment daily    - Set and communicate daily mobility goal to care team and patient/family/caregiver     - Collaborate with rehabilitation services on mobility goals if consulted  - Out of bed for meals 3 times a day  - Out of bed for toileting  - Record patient progress and toleration of activity level   Outcome: Progressing     Problem: DISCHARGE PLANNING  Goal: Discharge to home or other facility with appropriate resources  Description: INTERVENTIONS:  - Identify barriers to discharge w/patient and caregiver  - Arrange for needed discharge resources and transportation as appropriate  - Identify discharge learning needs (meds, wound care, etc )  - Arrange for interpretive services to assist at discharge as needed  - Refer to Case Management Department for coordinating discharge planning if the patient needs post-hospital services based on physician/advanced practitioner order or complex needs related to functional status, cognitive ability, or social support system  Outcome: Progressing     Problem: Knowledge Deficit  Goal: Patient/family/caregiver demonstrates understanding of disease process, treatment plan, medications, and discharge instructions  Description: Complete learning assessment and assess knowledge base    Interventions:  - Provide teaching at level of understanding  - Provide teaching via preferred learning methods  Outcome: Progressing     Problem: METABOLIC, FLUID AND ELECTROLYTES - ADULT  Goal: Electrolytes maintained within normal limits  Description: INTERVENTIONS:  - Monitor labs and assess patient for signs and symptoms of electrolyte imbalances  - Administer electrolyte replacement as ordered  - Monitor response to electrolyte replacements, including repeat lab results as appropriate  - Instruct patient on fluid and nutrition as appropriate  Outcome: Progressing  Goal: Fluid balance maintained  Description: INTERVENTIONS:  - Monitor labs   - Monitor I/O and WT  - Instruct patient on fluid and nutrition as appropriate  - Assess for signs & symptoms of volume excess or deficit  Outcome: Progressing     Problem: MOBILITY - ADULT  Goal: Maintain or return to baseline ADL function  Description: INTERVENTIONS:  -  Assess patient's ability to carry out ADLs; assess patient's baseline for ADL function and identify physical deficits which impact ability to perform ADLs (bathing, care of mouth/teeth, toileting, grooming, dressing, etc )  - Assess/evaluate cause of self-care deficits   - Assess range of motion  - Assess patient's mobility; develop plan if impaired  - Assess patient's need for assistive devices and provide as appropriate  - Encourage maximum independence but intervene and supervise when necessary  - Involve family in performance of ADLs  - Assess for home care needs following discharge   - Consider OT consult to assist with ADL evaluation and planning for discharge  - Provide patient education as appropriate  Outcome: Progressing  Goal: Maintains/Returns to pre admission functional level  Description: INTERVENTIONS:  - Perform BMAT or MOVE assessment daily    - Set and communicate daily mobility goal to care team and patient/family/caregiver     - Collaborate with rehabilitation services on mobility goals if consulted  - Out of bed for meals 3 times a day  - Out of bed for toileting  - Record patient progress and toleration of activity level   Outcome: Progressing     Problem: Potential for Falls  Goal: Patient will remain free of falls  Description: INTERVENTIONS:  - Educate patient/family on patient safety including physical limitations  - Instruct patient to call for assistance with activity   - Consult OT/PT to assist with strengthening/mobility   - Keep Call bell within reach  - Keep bed low and locked with side rails adjusted as appropriate  - Keep care items and personal belongings within reach  - Initiate and maintain comfort rounds  - Apply yellow socks and bracelet for high fall risk patients  - Consider moving patient to room near nurses station  Outcome: Progressing

## 2022-12-18 NOTE — PROGRESS NOTES
[address] [facility] NOTE   Zoltan Coker 27 y o  male MRN: [id number]  Unit/Bed#: [place] 2 -01 Encounter: [id number]  Reason for Consult: Acute kidney injury    ASSESSMENT and PLAN:  64-year-old with a past medical history of substance abuse, hepatitis C who presented with leg pain and decreased urination  Poor recall of events prior to hospitalization  Nephrology consulted for management of acute kidney injury  Acute kidney injury (POA):  · Etiology: Consistent with pigment injury due to rhabdomyolysis  · Toxicology screen positive for cocaine on admission  · Baseline creatinine: 0 8 to 0 9 mg/dL  · Admitted 12/12/2022 creatinine 6 46 with a peak creatinine of 8 44 on 12/14  · Initially treated with bicarbonate infusion due to metabolic acidosis and switched to Plasma-Lyte  · Maintained on Plasma-Lyte 75 mL/h  · Renal function continues to improve nicely  Creatinine down to 5 5  · Plan/recommendations:   · Will continue IV fluids through today  · Check labs in the a m   · Monitor intake and output  · Avoid nephrotoxic agents, hypotension  · Plan discussed with patient and patient's primary nurse  Rhabdomyolysis/left leg pain:  · Unclear origin although patient does not recall events leading to hospitalization  · CK 74,000 on admission  · Lower extremity Doppler negative for DVT  · CT: Significant subcu edema through the left upper thigh with enlargement of all surrounding muscles  · 12/16: CK down to 11,117  · 12/17: CK 6099  · Improving, continue Plasma-Lyte 75 mL/h    Anemia:  · Last hemoglobin 10 3, monitor  · Per primary team    Transaminitis:   · In the setting of rhabdomyolysis  · Liver enzymes decreasing    Other: Substance abuse      SUBJECTIVE / 24H INTERVAL HISTORY:  Less tingling left leg but leg remains swollen  Eating well  No complaints  No overnight events reported      OBJECTIVE:  Current Weight: Weight - Scale: 103 kg (227 lb 15 3 oz)  Vitals:    12/17/22 0815 12/17/22 2056 12/18/22 0600 12/18/22 0809   BP:  133/71  145/97   BP Location:  Left arm     Pulse:  82  78   Resp:  18  18   Temp:  98 3 °F (36 8 °C)  (!) 97 °F (36 1 °C)   TempSrc:  Oral     SpO2: 96% 100%  100%   Weight:   103 kg (227 lb 15 3 oz)    Height:           Intake/Output Summary (Last 24 hours) at 12/18/2022 0855  Last data filed at 12/17/2022 1432  Gross per 24 hour   Intake 2366 25 ml   Output --   Net 2366 25 ml     General: No acute distress  Lying comfortably in bed  Skin: Skin warm and dry, no rash  Eyes: Sclera clear, anicteric  ENT: Oropharynx moist  Neck: Neck supple, normal range of motion  Chest: Lungs clear bilaterally  Normal effort  CVS: Normal rate, regular rhythm, no murmur rub or gallop  Abdomen: Abdomen soft nondistended nontender  Bowel sounds present  Extremities: Left leg edema  Swollen but softer  : no lares  Neuro: Alert and oriented x3  Psych: Normal affect  Cooperative    Medications:    Current Facility-Administered Medications:   •  heparin (porcine) subcutaneous injection 5,000 Units, 5,000 Units, Subcutaneous, Q8H [facility], 5,000 Units at 12/18/22 0557 **AND** [COMPLETED] Platelet count, , , Once, Scar Pike MD  •  HYDROmorphone (DILAUDID) injection 0 5 mg, 0 5 mg, Intravenous, Q6H PRN, Scar Pike MD  •  hydrOXYzine HCL (ATARAX) tablet 25 mg, 25 mg, Oral, Q6H PRN, Ankit Simon PA-C, 25 mg at 12/16/22 2354  •  melatonin tablet 3 mg, 3 mg, Oral, HS, Ankit Simon PA-C, 3 mg at 12/17/22 2124  •  multi-electrolyte (PLASMALYTE-A/ISOLYTE-S PH 7 4) IV solution, 75 mL/hr, Intravenous, Continuous, Alvaro Horn, RANDYNP, Last Rate: 75 mL/hr at 12/18/22 0408, 75 mL/hr at 12/18/22 0408  •  nicotine (NICODERM CQ) 14 mg/24hr TD 24 hr patch 1 patch, 1 patch, Transdermal, Daily, Scar Pike MD  •  oxyCODONE (ROXICODONE) immediate release tablet 10 mg, 10 mg, Oral, Q6H PRN, Scar Pike MD, 10 mg at 12/16/22 0152  •  oxyCODONE (ROXICODONE) IR tablet 5 mg, 5 mg, Oral, Q6H PRN, Florence Coronado MD, 5 mg at 12/18/22 0411    Laboratory Results:  Results from last 7 days   Lab Units 12/18/22  0525 12/17/22  0504 12/16/22  0457 12/15/22  0529 12/14/22  0528 12/13/22  0614 12/12/22  1659 12/12/22  1208   WBC Thousand/uL  --   --   --   --  7 79 9 04  --  10 90*   HEMOGLOBIN g/dL  --   --   --   --  10 3* 10 8*  --  12 7   HEMATOCRIT %  --   --   --   --  29 9* 31 6*  --  37 4   PLATELETS Thousands/uL  --   --   --   --  259 279 285 305   POTASSIUM mmol/L 4 4 4 0 4 1 4 0 3 7 4 6  --  5 1   CHLORIDE mmol/L 105 103 99 99 96 98  --  94*   CO2 mmol/L 26 26 27 26 24 17*  --  20*   BUN mg/dL 82* 94* 97* 104* 106* 96*  --  86*   CREATININE mg/dL 5 55* 6 58* 7 49* 8 31* 8 44* 7 58*  --  6 46*   CALCIUM mg/dL 8 4 8 7 8 5 8 6 8 0* 7 9*  --  8 3   MAGNESIUM mg/dL  --   --  2 4  --   --   --   --   --

## 2022-12-18 NOTE — ASSESSMENT & PLAN NOTE
Secondary to rhabdomyolysis in the setting of drug abuse  Peak creatinine of 8 44   · Treatment plan as written above      Recent Labs     12/16/22  0457 12/17/22  0504 12/18/22  0525   BUN 97* 94* 82*   CREATININE 7 49* 6 58* 5 55*   EGFR 8 10 12       Results from last 7 days   Lab Units 12/12/22  1345   BLOOD UA  Large*   PROTEIN UA mg/dl 100 (2+)*

## 2022-12-18 NOTE — ASSESSMENT & PLAN NOTE
Doppler ultrasound negative for DVT  Suspect that this is due to post trauma which resulted in his rhabdomyolysis  Improving, but still swollen   Able to move toes, sensation intact

## 2022-12-19 PROBLEM — B19.20 HEPATITIS C VIRUS INFECTION WITHOUT HEPATIC COMA: Status: ACTIVE | Noted: 2022-12-19

## 2022-12-19 LAB
ALBUMIN SERPL BCP-MCNC: 2.4 G/DL (ref 3.5–5)
ALP SERPL-CCNC: 45 U/L (ref 46–116)
ALT SERPL W P-5'-P-CCNC: 150 U/L (ref 12–78)
ANION GAP SERPL CALCULATED.3IONS-SCNC: 10 MMOL/L (ref 4–13)
AST SERPL W P-5'-P-CCNC: 97 U/L (ref 5–45)
BILIRUB SERPL-MCNC: 0.33 MG/DL (ref 0.2–1)
BUN SERPL-MCNC: 73 MG/DL (ref 5–25)
CALCIUM ALBUM COR SERPL-MCNC: 10 MG/DL (ref 8.3–10.1)
CALCIUM SERPL-MCNC: 8.7 MG/DL (ref 8.3–10.1)
CHLORIDE SERPL-SCNC: 105 MMOL/L (ref 96–108)
CK MB SERPL-MCNC: 6.4 NG/ML (ref 0–5)
CK MB SERPL-MCNC: <1 % (ref 0–2.5)
CK SERPL-CCNC: 2083 U/L (ref 39–308)
CO2 SERPL-SCNC: 27 MMOL/L (ref 21–32)
CREAT SERPL-MCNC: 4.66 MG/DL (ref 0.6–1.3)
GFR SERPL CREATININE-BSD FRML MDRD: 15 ML/MIN/1.73SQ M
GLUCOSE SERPL-MCNC: 87 MG/DL (ref 65–140)
POTASSIUM SERPL-SCNC: 4.5 MMOL/L (ref 3.5–5.3)
PROT SERPL-MCNC: 5.6 G/DL (ref 6.4–8.4)
SODIUM SERPL-SCNC: 142 MMOL/L (ref 135–147)

## 2022-12-19 RX ADMIN — HYDROXYZINE HYDROCHLORIDE 25 MG: 25 TABLET ORAL at 22:30

## 2022-12-19 RX ADMIN — HYDROXYZINE HYDROCHLORIDE 25 MG: 25 TABLET ORAL at 14:07

## 2022-12-19 RX ADMIN — HEPARIN SODIUM 5000 UNITS: 5000 INJECTION INTRAVENOUS; SUBCUTANEOUS at 22:29

## 2022-12-19 RX ADMIN — OXYCODONE HYDROCHLORIDE 10 MG: 10 TABLET ORAL at 22:30

## 2022-12-19 RX ADMIN — HEPARIN SODIUM 5000 UNITS: 5000 INJECTION INTRAVENOUS; SUBCUTANEOUS at 14:06

## 2022-12-19 RX ADMIN — Medication 3 MG: at 22:30

## 2022-12-19 RX ADMIN — HEPARIN SODIUM 5000 UNITS: 5000 INJECTION INTRAVENOUS; SUBCUTANEOUS at 05:28

## 2022-12-19 NOTE — ASSESSMENT & PLAN NOTE
Secondary to rhabdomyolysis in the setting of drug abuse   Peak creatinine of 8 44   · Creatinine continues to improve, continue IV fluids  · Follow nephrology recommendations    Recent Labs     12/17/22  0504 12/18/22  0525 12/19/22  0545   BUN 94* 82* 73*   CREATININE 6 58* 5 55* 4 66*   EGFR 10 12 15       Results from last 7 days   Lab Units 12/12/22  1345   BLOOD UA  Large*   PROTEIN UA mg/dl 100 (2+)*

## 2022-12-19 NOTE — PROGRESS NOTES
[facility] [facility]  Progress Note - Lonnie Loera 1990, 27 y o  male MRN: [id number]  Unit/Bed#: [address] 87 203-01 Encounter: [id number]  Primary Care Provider: Larry Hernandez PA-C   Date and time admitted to hospital: 12/12/2022 11:12 AM    * Rhabdomyolysis  Assessment & Plan  61-year-old male presented to institution due to left lower extremity pain after he woke up from pain as a result of " overdoing it" in the setting of drug use  · CK levels continue to downtrend  · Acidosis resolved with bicarbonate drip  · Continues to remain on IV fluids  · Management per nephrology, input appreciated   · Renal function continues to improve     Recent Labs     12/17/22  0504 12/19/22  0545   CKTOTAL 6,099* 2,083*         SUSANA (acute kidney injury) [facility])  Assessment & Plan  Secondary to rhabdomyolysis in the setting of drug abuse  Peak creatinine of 8 44   · Creatinine continues to improve, continue IV fluids  · Follow nephrology recommendations    Recent Labs     12/17/22  0504 12/18/22  0525 12/19/22  0545   BUN 94* 82* 73*   CREATININE 6 58* 5 55* 4 66*   EGFR 10 12 15       Results from last 7 days   Lab Units 12/12/22  1345   BLOOD UA  Large*   PROTEIN UA mg/dl 100 (2+)*       Hepatitis C virus infection without hepatic coma  Assessment & Plan  Will need outpatient follow-up with GI    Anemia  Assessment & Plan  Stable  Possibly a component of dilutional anemia as a result of aggressive IV fluid hydration  · No evidence of acute bleeding at this time  No indication for transfusion  Elevated LFTs  Assessment & Plan  Due to rhabdomyolysis      · Hepatitis panel positive for Hep C - patient will need f/u with GI   · Right upper quadrant ultrasound showing no acute findings  · LFTs improving     Recent Labs     12/17/22  0504 12/18/22  0525 12/19/22  0545   * 122* 97*   * 170* 150*   TBILI 0 33 0 27 0 33   ALKPHOS 47 43* 45*         Leg pain  Assessment & Plan  Doppler ultrasound negative for DVT  Suspect that this is due to post trauma which resulted in his rhabdomyolysis  Improving, but still swollen  Able to move toes, sensation intact  Continue IVF for now     Substance abuse [facility])  Assessment & Plan  History of IV drug abuse, UDS positive for cocaine  · Previously on Suboxone, has been off it for several days, will hold off at this time  · Continue to monitor for signs of withdrawal        VTE Pharmacologic Prophylaxis:   heparin     Patient Centered Rounds: I performed bedside rounds with nursing staff today  Discussions with Specialists or Other Care Team Provider: Nephrology    Education and Discussions with Family / Patient: Updated  (Patient) at bedside  Time Spent for Care: 30 minutes  More than 50% of total time spent on counseling and coordination of care as described above  Current Length of Stay: 7 day(s)  Current Patient Status: Inpatient   Certification Statement: The patient will continue to require additional inpatient hospital stay due to IV treatments, close monitoring  Discharge Plan: Anticipate discharge in 48-72 hrs to home  Code Status: Level 1 - Full Code    Subjective:   Patient seen and examined  He reports feeling well overall  No complaints  Objective:     Vitals:   Temp (24hrs), Av 1 °F (36 7 °C), Min:96 9 °F (36 1 °C), Max:99 4 °F (37 4 °C)    Temp:  [96 9 °F (36 1 °C)-99 4 °F (37 4 °C)] 96 9 °F (36 1 °C)  HR:  [79-98] 81  Resp:  [14-18] 14  BP: (125-148)/(71-80) 130/71  SpO2:  [97 %-100 %] 97 %  Body mass index is 35 23 kg/m²  Input and Output Summary (last 24 hours): Intake/Output Summary (Last 24 hours) at 2022 1234  Last data filed at 2022 1622  Gross per 24 hour   Intake 1500 ml   Output --   Net 1500 ml       Physical Exam:   Physical Exam  Constitutional:       General: He is not in acute distress  HENT:      Head: Normocephalic and atraumatic  Nose: No congestion     Cardiovascular: Rate and Rhythm: Normal rate and regular rhythm  Heart sounds: No murmur heard  Pulmonary:      Effort: No respiratory distress  Breath sounds: No wheezing or rales  Abdominal:      General: There is no distension  Tenderness: There is no abdominal tenderness  Musculoskeletal:      Right lower leg: Edema present  Left lower leg: Edema present  Skin:     General: Skin is warm and dry  Neurological:      Mental Status: He is oriented to person, place, and time     Psychiatric:         Mood and Affect: Mood normal           Additional Data:     Labs:  Results from last 7 days   Lab Units 12/14/22  0528   WBC Thousand/uL 7 79   HEMOGLOBIN g/dL 10 3*   HEMATOCRIT % 29 9*   PLATELETS Thousands/uL 259     Results from last 7 days   Lab Units 12/19/22  0545   SODIUM mmol/L 142   POTASSIUM mmol/L 4 5   CHLORIDE mmol/L 105   CO2 mmol/L 27   BUN mg/dL 73*   CREATININE mg/dL 4 66*   ANION GAP mmol/L 10   CALCIUM mg/dL 8 7   ALBUMIN g/dL 2 4*   TOTAL BILIRUBIN mg/dL 0 33   ALK PHOS U/L 45*   ALT U/L 150*   AST U/L 97*   GLUCOSE RANDOM mg/dL 87     Results from last 7 days   Lab Units 12/14/22  0528   INR  1 06                   Lines/Drains:  Invasive Devices     Peripheral Intravenous Line  Duration           Peripheral IV 12/16/22 Left;Proximal;Ventral (anterior) Forearm 3 days                  Imaging: b/l venous duplex     Recent Cultures (last 7 days):         Last 24 Hours Medication List:   Current Facility-Administered Medications   Medication Dose Route Frequency Provider Last Rate   • heparin (porcine)  5,000 Units Subcutaneous Q8H [facility] Abhishek Silvestre MD     • HYDROmorphone  0 5 mg Intravenous Q6H PRN Chen Adkins MD     • hydrOXYzine HCL  25 mg Oral Q6H PRN Ling Steele PA-C     • melatonin  3 mg Oral HS Ling Steele PA-C     • multi-electrolyte  75 mL/hr Intravenous Continuous Leafy Wilkinson, CRNP 75 mL/hr (12/18/22 0408)   • nicotine  1 patch Transdermal Daily Chen Adkins MD     • oxyCODONE  10 mg Oral Q6H PRN Jose Maddox MD     • oxyCODONE  5 mg Oral Q6H PRN Jose Maddox MD          Today, Patient Was Seen By: Isaac Corcoran MD    **Please Note: This note may have been constructed using a voice recognition system  **

## 2022-12-19 NOTE — ASSESSMENT & PLAN NOTE
68-year-old male presented to institution due to left lower extremity pain after he woke up from pain as a result of " overdoing it" in the setting of drug use  · CK levels continue to downtrend  · Acidosis resolved with bicarbonate drip  · Continues to remain on IV fluids      · Management per nephrology, input appreciated   · Renal function continues to improve     Recent Labs     12/17/22  0504 12/19/22  0545   CKTOTAL 6,099* 2,083*

## 2022-12-19 NOTE — CASE MANAGEMENT
Case Management Progress Note    Patient name Linnette Ashley  Location [facility] [address] [place]* MRN [id number]  : 1990 Date 2022       LOS (days): 7  Geometric Mean LOS (GMLOS) (days):   Days to [facility]:        OBJECTIVE:        Current admission status: Inpatient  Preferred Pharmacy:   [place] [address] [address]  Phone: [phone] Fax: [phone]    Primary Care Provider: Ti Fan PA-C    Primary Insurance: Aleyda Arshad MA [facility]  Secondary Insurance:     PROGRESS NOTE:  CM called Brenda with HOST  Informed her that the pt is not medically stable for discharge  Marito Buckley requested we notify her when the pt is ready for discharge  She will update Pyramid

## 2022-12-19 NOTE — PLAN OF CARE
Problem: PAIN - ADULT  Goal: Verbalizes/displays adequate comfort level or baseline comfort level  Description: Interventions:  - Encourage patient to monitor pain and request assistance  - Assess pain using appropriate pain scale  - Administer analgesics based on type and severity of pain and evaluate response  - Implement non-pharmacological measures as appropriate and evaluate response  - Consider cultural and social influences on pain and pain management  - Notify physician/advanced practitioner if interventions unsuccessful or patient reports new pain  Outcome: Progressing     Problem: INFECTION - ADULT  Goal: Absence or prevention of progression during hospitalization  Description: INTERVENTIONS:  - Assess and monitor for signs and symptoms of infection  - Monitor lab/diagnostic results  - Monitor all insertion sites, i e  indwelling lines, tubes, and drains  - Monitor endotracheal if appropriate and nasal secretions for changes in amount and color  - [place] appropriate cooling/warming therapies per order  - Administer medications as ordered  - Instruct and encourage patient and family to use good hand hygiene technique  - Identify and instruct in appropriate isolation precautions for identified infection/condition  Outcome: Progressing     Problem: SAFETY ADULT  Goal: Patient will remain free of falls  Description: INTERVENTIONS:  - Educate patient/family on patient safety including physical limitations  - Instruct patient to call for assistance with activity   - Consult OT/PT to assist with strengthening/mobility   - Keep Call bell within reach  - Keep bed low and locked with side rails adjusted as appropriate  - Keep care items and personal belongings within reach  - Initiate and maintain comfort rounds  - Apply yellow socks and bracelet for high fall risk patients  - Consider moving patient to room near nurses station  Outcome: Progressing  Goal: Maintain or return to baseline ADL function  Description: INTERVENTIONS:  -  Assess patient's ability to carry out ADLs; assess patient's baseline for ADL function and identify physical deficits which impact ability to perform ADLs (bathing, care of mouth/teeth, toileting, grooming, dressing, etc )  - Assess/evaluate cause of self-care deficits   - Assess range of motion  - Assess patient's mobility; develop plan if impaired  - Assess patient's need for assistive devices and provide as appropriate  - Encourage maximum independence but intervene and supervise when necessary  - Involve family in performance of ADLs  - Assess for home care needs following discharge   - Consider OT consult to assist with ADL evaluation and planning for discharge  - Provide patient education as appropriate  Outcome: Progressing  Goal: Maintains/Returns to pre admission functional level  Description: INTERVENTIONS:  - Perform BMAT or MOVE assessment daily    - Set and communicate daily mobility goal to care team and patient/family/caregiver     - Collaborate with rehabilitation services on mobility goals if consulted  - Out of bed for meals 3 times a day  - Out of bed for toileting  - Record patient progress and toleration of activity level   Outcome: Progressing     Problem: DISCHARGE PLANNING  Goal: Discharge to home or other facility with appropriate resources  Description: INTERVENTIONS:  - Identify barriers to discharge w/patient and caregiver  - Arrange for needed discharge resources and transportation as appropriate  - Identify discharge learning needs (meds, wound care, etc )  - Arrange for interpretive services to assist at discharge as needed  - Refer to Case Management Department for coordinating discharge planning if the patient needs post-hospital services based on physician/advanced practitioner order or complex needs related to functional status, cognitive ability, or social support system  Outcome: Progressing     Problem: Knowledge Deficit  Goal: Patient/family/caregiver demonstrates understanding of disease process, treatment plan, medications, and discharge instructions  Description: Complete learning assessment and assess knowledge base    Interventions:  - Provide teaching at level of understanding  - Provide teaching via preferred learning methods  Outcome: Progressing     Problem: METABOLIC, FLUID AND ELECTROLYTES - ADULT  Goal: Electrolytes maintained within normal limits  Description: INTERVENTIONS:  - Monitor labs and assess patient for signs and symptoms of electrolyte imbalances  - Administer electrolyte replacement as ordered  - Monitor response to electrolyte replacements, including repeat lab results as appropriate  - Instruct patient on fluid and nutrition as appropriate  Outcome: Progressing  Goal: Fluid balance maintained  Description: INTERVENTIONS:  - Monitor labs   - Monitor I/O and WT  - Instruct patient on fluid and nutrition as appropriate  - Assess for signs & symptoms of volume excess or deficit  Outcome: Progressing     Problem: MOBILITY - ADULT  Goal: Maintain or return to baseline ADL function  Description: INTERVENTIONS:  -  Assess patient's ability to carry out ADLs; assess patient's baseline for ADL function and identify physical deficits which impact ability to perform ADLs (bathing, care of mouth/teeth, toileting, grooming, dressing, etc )  - Assess/evaluate cause of self-care deficits   - Assess range of motion  - Assess patient's mobility; develop plan if impaired  - Assess patient's need for assistive devices and provide as appropriate  - Encourage maximum independence but intervene and supervise when necessary  - Involve family in performance of ADLs  - Assess for home care needs following discharge   - Consider OT consult to assist with ADL evaluation and planning for discharge  - Provide patient education as appropriate  Outcome: Progressing  Goal: Maintains/Returns to pre admission functional level  Description: INTERVENTIONS:  - Perform BMAT or MOVE assessment daily    - Set and communicate daily mobility goal to care team and patient/family/caregiver     - Collaborate with rehabilitation services on mobility goals if consulted  - Out of bed for meals 3 times a day  - Out of bed for toileting  - Record patient progress and toleration of activity level   Outcome: Progressing     Problem: Potential for Falls  Goal: Patient will remain free of falls  Description: INTERVENTIONS:  - Educate patient/family on patient safety including physical limitations  - Instruct patient to call for assistance with activity   - Consult OT/PT to assist with strengthening/mobility   - Keep Call bell within reach  - Keep bed low and locked with side rails adjusted as appropriate  - Keep care items and personal belongings within reach  - Initiate and maintain comfort rounds  - Apply yellow socks and bracelet for high fall risk patients  - Consider moving patient to room near nurses station  Outcome: Progressing

## 2022-12-19 NOTE — ASSESSMENT & PLAN NOTE
Due to rhabdomyolysis      · Hepatitis panel positive for Hep C - patient will need f/u with GI   · Right upper quadrant ultrasound showing no acute findings  · LFTs improving     Recent Labs     12/17/22  0504 12/18/22  0525 12/19/22  0545   * 122* 97*   * 170* 150*   TBILI 0 33 0 27 0 33   ALKPHOS 47 43* 45*

## 2022-12-19 NOTE — ASSESSMENT & PLAN NOTE
Doppler ultrasound negative for DVT  Suspect that this is due to post trauma which resulted in his rhabdomyolysis  Improving, but still swollen   Able to move toes, sensation intact  Continue IVF for now

## 2022-12-19 NOTE — ASSESSMENT & PLAN NOTE
History of IV drug abuse, UDS positive for cocaine  · Previously on Suboxone, has been off it for several days, will hold off at this time  · Continue to monitor for signs of withdrawal

## 2022-12-19 NOTE — PROGRESS NOTES
NEPHROLOGY PROGRESS NOTE   Jessica Shukla 27 y o  male MRN: [id number]  Unit/Bed#: [facility] [id number] Encounter: [id number]  Reason for Consult: SUSANA    ASSESSMENT AND PLAN:  78-year-old male with significant medical issues of substance abuse, hep C presented with leg pain, decreased urination  We are consulted for SUSANA management  Severe SUSANA POA, baseline creatinine 0 8-0 9  -SUSANA suspect secondary to rhabdomyolysis, ATN  -Peak creatinine 8 4 now continue to significantly improved to 4 6 today  -UA this admission showed 2+ proteinuria, consider repeat your once renal function improved and stable  -Currently on IV Plasma-Lyte 75 mill per hour, continue same for today  -BMP in a m  Severe rhabdomyolysis, initial CK 74,000, now continue to slowly trend down with most recent 2083 today  -Continue to trend    Severe azotemia initially, now continue to improve with improving renal function  Severe transaminitis, overall slowly improving, continue to trend  Anemia of chronic disease, management as per primary team   Left lower extremity edema, venous Doppler shows no evidence of DVT  Management as per primary team     Discussed above plan in detail with primary team     SUBJECTIVE:  Patient seen and examined at bedside  Denies chest pain, shortness of breath  Denies nausea, vomiting      OBJECTIVE:  Current Weight: Weight - Scale: 105 kg (231 lb 11 3 oz)  Vitals:    12/19/22 0810   BP:    Pulse:    Resp:    Temp:    SpO2: 97%       Intake/Output Summary (Last 24 hours) at 12/19/2022 0949  Last data filed at 12/18/2022 1622  Gross per 24 hour   Intake 1500 ml   Output --   Net 1500 ml     Wt Readings from Last 3 Encounters:   12/19/22 105 kg (231 lb 11 3 oz)   11/12/16 81 6 kg (180 lb)   11/11/16 81 6 kg (180 lb)     Temp Readings from Last 3 Encounters:   12/19/22 (!) 96 9 °F (36 1 °C) (Oral)   06/05/19 98 3 °F (36 8 °C) (Tympanic)   11/12/16 98 1 °F (36 7 °C) (Oral)     BP Readings from Last 3 Encounters: 12/19/22 130/71   06/06/19 114/56   11/12/16 135/67     Pulse Readings from Last 3 Encounters:   12/19/22 81   06/06/19 62   11/12/16 75        Physical Examination:  General: Lying in bed, no acute distress   Eyes: Mild conjunctival pallor present  ENT: External examination of ears and nose unremarkable  Neck: No obvious lymphadenopathy appreciated  Respiratory: Bilateral air entry present  CVS: S1, S2 present  GI: Soft, nondistended  CNS: Active, alert oriented x3  Skin: No new rash  Musculoskeletal: No obvious new gross deformity noted    Medications:    Current Facility-Administered Medications:   •  heparin (porcine) subcutaneous injection 5,000 Units, 5,000 Units, Subcutaneous, Q8H [address], 5,000 Units at 12/19/22 0528 **AND** [COMPLETED] Platelet count, , , Once, Carmella Egan MD  •  HYDROmorphone (DILAUDID) injection 0 5 mg, 0 5 mg, Intravenous, Q6H PRN, Carmella Egan MD  •  hydrOXYzine HCL (ATARAX) tablet 25 mg, 25 mg, Oral, Q6H PRN, Froilan Fields PA-C, 25 mg at 12/18/22 2227  •  melatonin tablet 3 mg, 3 mg, Oral, HS, Froilan Fields PA-C, 3 mg at 12/18/22 2227  •  multi-electrolyte (PLASMALYTE-A/ISOLYTE-S PH 7 4) IV solution, 75 mL/hr, Intravenous, Continuous, ERNESTO Mario, Last Rate: 75 mL/hr at 12/18/22 0408, 75 mL/hr at 12/18/22 0408  •  nicotine (NICODERM CQ) 14 mg/24hr TD 24 hr patch 1 patch, 1 patch, Transdermal, Daily, Carmella Egan MD  •  oxyCODONE (ROXICODONE) immediate release tablet 10 mg, 10 mg, Oral, Q6H PRN, Carmella Egan MD, 10 mg at 12/18/22 2227  •  oxyCODONE (ROXICODONE) IR tablet 5 mg, 5 mg, Oral, Q6H PRN, Carmella Egan MD, 5 mg at 12/18/22 0411    Laboratory Results:  Results from last 7 days   Lab Units 12/19/22  0545 12/18/22  0525 12/17/22  0504 12/16/22  0457 12/15/22  0529 12/14/22  0528 12/13/22  0614 12/12/22  1659 12/12/22  1208   WBC Thousand/uL  --   --   --   --   --  7 79 9 04  --  10 90*   HEMOGLOBIN g/dL  --   --   --   --   --  10 3* 10 8*  --  12 7 HEMATOCRIT %  --   --   --   --   --  29 9* 31 6*  --  37 4   PLATELETS Thousands/uL  --   --   --   --   --  259 279 285 305   SODIUM mmol/L 142 142 142 140 137 136 133*  --  128*   POTASSIUM mmol/L 4 5 4 4 4 0 4 1 4 0 3 7 4 6  --  5 1   CHLORIDE mmol/L 105 105 103 99 99 96 98  --  94*   CO2 mmol/L 27 26 26 27 26 24 17*  --  20*   BUN mg/dL 73* 82* 94* 97* 104* 106* 96*  --  86*   CREATININE mg/dL 4 66* 5 55* 6 58* 7 49* 8 31* 8 44* 7 58*  --  6 46*   CALCIUM mg/dL 8 7 8 4 8 7 8 5 8 6 8 0* 7 9*  --  8 3   MAGNESIUM mg/dL  --   --   --  2 4  --   --   --   --   --        US right upper quadrant with liver dopplers   Final Result by Loy Em MD (12/16 0802)      Normal       Workstation performed: [id number]         VAS lower limb venous duplex study, unilateral/limited   Final Result by Velasquez Hector MD (12/12 2135)      XR hip/pelv 2-3 vws left if performed   Final Result by Tom Jones MD (12/12 1435)      No acute osseous abnormality  Workstation performed: [id number]         CT recon only lumbar spine   Final Result by Peggy Mcguire MD (12/12 1431)      No fracture or traumatic subluxation  Bilateral L5 spondylolysis without evidence of spondylolisthesis  Mild anasarca  Minimal free fluid in the pelvis  Workstation performed: [id number]         CT abdomen pelvis wo contrast   Final Result by Peggy Mcguire MD (12/12 1428)      Significant subcutaneous edema throughout the left upper thigh with enlargement of all of the surrounding muscles suggesting infectious or posttraumatic etiology  Correlate clinically  Severe bladder wall thickening likely represent cystitis  Nonspecific free fluid in the pelvis dependently  This could be related to cystitis/infection versus trauma              Workstation performed: [id number]         CT spine cervical without contrast   Final Result by Peggy Mcguire MD (12/12 1422)      No cervical spine fracture or traumatic malalignment  Workstation performed: [id number]         CT head without contrast   Final Result by Lazara Parker MD (12/12 1420)      No acute intracranial abnormality  Extensive frontal scalp hematoma without underlying fracture identified  Radiopaque foreign body in the left parietal scalp region which is partially lodged in the calvarium measuring 1 1 x 0 9 cm  Workstation performed: [id number]             Portions of the record may have been created with voice recognition software  Occasional wrong word or "sound a like" substitutions may have occurred due to the inherent limitations of voice recognition software  Read the chart carefully and recognize, using context, where substitutions have occurred

## 2022-12-20 PROBLEM — R60.0 LOWER EXTREMITY EDEMA: Status: ACTIVE | Noted: 2022-12-12

## 2022-12-20 LAB
ALBUMIN SERPL BCP-MCNC: 2.4 G/DL (ref 3.5–5)
ALP SERPL-CCNC: 45 U/L (ref 46–116)
ALT SERPL W P-5'-P-CCNC: 134 U/L (ref 12–78)
ANION GAP SERPL CALCULATED.3IONS-SCNC: 10 MMOL/L (ref 4–13)
AST SERPL W P-5'-P-CCNC: 75 U/L (ref 5–45)
BASOPHILS # BLD AUTO: 0.04 THOUSANDS/ÂΜL (ref 0–0.1)
BASOPHILS NFR BLD AUTO: 0 % (ref 0–1)
BILIRUB SERPL-MCNC: 0.23 MG/DL (ref 0.2–1)
BUN SERPL-MCNC: 64 MG/DL (ref 5–25)
CALCIUM ALBUM COR SERPL-MCNC: 9.9 MG/DL (ref 8.3–10.1)
CALCIUM SERPL-MCNC: 8.6 MG/DL (ref 8.3–10.1)
CHLORIDE SERPL-SCNC: 108 MMOL/L (ref 96–108)
CK MB SERPL-MCNC: 7 NG/ML (ref 0–5)
CK MB SERPL-MCNC: <1 % (ref 0–2.5)
CK SERPL-CCNC: 1385 U/L (ref 39–308)
CO2 SERPL-SCNC: 27 MMOL/L (ref 21–32)
CREAT SERPL-MCNC: 3.97 MG/DL (ref 0.6–1.3)
EOSINOPHIL # BLD AUTO: 0.43 THOUSAND/ÂΜL (ref 0–0.61)
EOSINOPHIL NFR BLD AUTO: 4 % (ref 0–6)
ERYTHROCYTE [DISTWIDTH] IN BLOOD BY AUTOMATED COUNT: 14.5 % (ref 11.6–15.1)
FOLATE SERPL-MCNC: 15.5 NG/ML (ref 3.1–17.5)
GFR SERPL CREATININE-BSD FRML MDRD: 18 ML/MIN/1.73SQ M
GLUCOSE SERPL-MCNC: 91 MG/DL (ref 65–140)
HCT VFR BLD AUTO: 27.4 % (ref 36.5–49.3)
HGB BLD-MCNC: 8.8 G/DL (ref 12–17)
IMM GRANULOCYTES # BLD AUTO: 0.07 THOUSAND/UL (ref 0–0.2)
IMM GRANULOCYTES NFR BLD AUTO: 1 % (ref 0–2)
LYMPHOCYTES # BLD AUTO: 2.12 THOUSANDS/ÂΜL (ref 0.6–4.47)
LYMPHOCYTES NFR BLD AUTO: 21 % (ref 14–44)
MCH RBC QN AUTO: 29.5 PG (ref 26.8–34.3)
MCHC RBC AUTO-ENTMCNC: 32.1 G/DL (ref 31.4–37.4)
MCV RBC AUTO: 92 FL (ref 82–98)
MONOCYTES # BLD AUTO: 0.7 THOUSAND/ÂΜL (ref 0.17–1.22)
MONOCYTES NFR BLD AUTO: 7 % (ref 4–12)
NEUTROPHILS # BLD AUTO: 6.57 THOUSANDS/ÂΜL (ref 1.85–7.62)
NEUTS SEG NFR BLD AUTO: 67 % (ref 43–75)
NRBC BLD AUTO-RTO: 0 /100 WBCS
PLATELET # BLD AUTO: 267 THOUSANDS/UL (ref 149–390)
PMV BLD AUTO: 9.6 FL (ref 8.9–12.7)
POTASSIUM SERPL-SCNC: 4.7 MMOL/L (ref 3.5–5.3)
PROT SERPL-MCNC: 5.6 G/DL (ref 6.4–8.4)
RBC # BLD AUTO: 2.98 MILLION/UL (ref 3.88–5.62)
SODIUM SERPL-SCNC: 145 MMOL/L (ref 135–147)
WBC # BLD AUTO: 9.93 THOUSAND/UL (ref 4.31–10.16)

## 2022-12-20 RX ORDER — SODIUM CHLORIDE 450 MG/100ML
50 INJECTION, SOLUTION INTRAVENOUS CONTINUOUS
Status: DISCONTINUED | OUTPATIENT
Start: 2022-12-20 | End: 2022-12-21

## 2022-12-20 RX ADMIN — SODIUM CHLORIDE 50 ML/HR: 4.5 INJECTION, SOLUTION INTRAVENOUS at 11:33

## 2022-12-20 RX ADMIN — HEPARIN SODIUM 5000 UNITS: 5000 INJECTION INTRAVENOUS; SUBCUTANEOUS at 14:06

## 2022-12-20 RX ADMIN — Medication 3 MG: at 21:07

## 2022-12-20 RX ADMIN — Medication 1 PATCH: at 09:26

## 2022-12-20 RX ADMIN — HEPARIN SODIUM 5000 UNITS: 5000 INJECTION INTRAVENOUS; SUBCUTANEOUS at 05:37

## 2022-12-20 RX ADMIN — HYDROXYZINE HYDROCHLORIDE 25 MG: 25 TABLET ORAL at 21:07

## 2022-12-20 RX ADMIN — OXYCODONE HYDROCHLORIDE 5 MG: 5 TABLET ORAL at 23:33

## 2022-12-20 RX ADMIN — HEPARIN SODIUM 5000 UNITS: 5000 INJECTION INTRAVENOUS; SUBCUTANEOUS at 21:07

## 2022-12-20 RX ADMIN — SODIUM CHLORIDE, SODIUM GLUCONATE, SODIUM ACETATE, POTASSIUM CHLORIDE, MAGNESIUM CHLORIDE, SODIUM PHOSPHATE, DIBASIC, AND POTASSIUM PHOSPHATE 75 ML/HR: .53; .5; .37; .037; .03; .012; .00082 INJECTION, SOLUTION INTRAVENOUS at 04:21

## 2022-12-20 NOTE — PLAN OF CARE
Problem: PAIN - ADULT  Goal: Verbalizes/displays adequate comfort level or baseline comfort level  Description: Interventions:  - Encourage patient to monitor pain and request assistance  - Assess pain using appropriate pain scale  - Administer analgesics based on type and severity of pain and evaluate response  - Implement non-pharmacological measures as appropriate and evaluate response  - Consider cultural and social influences on pain and pain management  - Notify physician/advanced practitioner if interventions unsuccessful or patient reports new pain  Outcome: Progressing     Problem: INFECTION - ADULT  Goal: Absence or prevention of progression during hospitalization  Description: INTERVENTIONS:  - Assess and monitor for signs and symptoms of infection  - Monitor lab/diagnostic results  - Monitor all insertion sites, i e  indwelling lines, tubes, and drains  - Monitor endotracheal if appropriate and nasal secretions for changes in amount and color  - [place] appropriate cooling/warming therapies per order  - Administer medications as ordered  - Instruct and encourage patient and family to use good hand hygiene technique  - Identify and instruct in appropriate isolation precautions for identified infection/condition  Outcome: Progressing     Problem: SAFETY ADULT  Goal: Patient will remain free of falls  Description: INTERVENTIONS:  - Educate patient/family on patient safety including physical limitations  - Instruct patient to call for assistance with activity   - Consult OT/PT to assist with strengthening/mobility   - Keep Call bell within reach  - Keep bed low and locked with side rails adjusted as appropriate  - Keep care items and personal belongings within reach  - Initiate and maintain comfort rounds  - Apply yellow socks and bracelet for high fall risk patients  - Consider moving patient to room near nurses station  Outcome: Progressing  Goal: Maintain or return to baseline ADL function  Description: INTERVENTIONS:  -  Assess patient's ability to carry out ADLs; assess patient's baseline for ADL function and identify physical deficits which impact ability to perform ADLs (bathing, care of mouth/teeth, toileting, grooming, dressing, etc )  - Assess/evaluate cause of self-care deficits   - Assess range of motion  - Assess patient's mobility; develop plan if impaired  - Assess patient's need for assistive devices and provide as appropriate  - Encourage maximum independence but intervene and supervise when necessary  - Involve family in performance of ADLs  - Assess for home care needs following discharge   - Consider OT consult to assist with ADL evaluation and planning for discharge  - Provide patient education as appropriate  Outcome: Progressing  Goal: Maintains/Returns to pre admission functional level  Description: INTERVENTIONS:  - Perform BMAT or MOVE assessment daily    - Set and communicate daily mobility goal to care team and patient/family/caregiver     - Collaborate with rehabilitation services on mobility goals if consulted  - Out of bed for meals 3 times a day  - Out of bed for toileting  - Record patient progress and toleration of activity level   Outcome: Progressing     Problem: DISCHARGE PLANNING  Goal: Discharge to home or other facility with appropriate resources  Description: INTERVENTIONS:  - Identify barriers to discharge w/patient and caregiver  - Arrange for needed discharge resources and transportation as appropriate  - Identify discharge learning needs (meds, wound care, etc )  - Arrange for interpretive services to assist at discharge as needed  - Refer to Case Management Department for coordinating discharge planning if the patient needs post-hospital services based on physician/advanced practitioner order or complex needs related to functional status, cognitive ability, or social support system  Outcome: Progressing     Problem: Knowledge Deficit  Goal: Patient/family/caregiver demonstrates understanding of disease process, treatment plan, medications, and discharge instructions  Description: Complete learning assessment and assess knowledge base    Interventions:  - Provide teaching at level of understanding  - Provide teaching via preferred learning methods  Outcome: Progressing     Problem: METABOLIC, FLUID AND ELECTROLYTES - ADULT  Goal: Electrolytes maintained within normal limits  Description: INTERVENTIONS:  - Monitor labs and assess patient for signs and symptoms of electrolyte imbalances  - Administer electrolyte replacement as ordered  - Monitor response to electrolyte replacements, including repeat lab results as appropriate  - Instruct patient on fluid and nutrition as appropriate  Outcome: Progressing  Goal: Fluid balance maintained  Description: INTERVENTIONS:  - Monitor labs   - Monitor I/O and WT  - Instruct patient on fluid and nutrition as appropriate  - Assess for signs & symptoms of volume excess or deficit  Outcome: Progressing     Problem: MOBILITY - ADULT  Goal: Maintain or return to baseline ADL function  Description: INTERVENTIONS:  -  Assess patient's ability to carry out ADLs; assess patient's baseline for ADL function and identify physical deficits which impact ability to perform ADLs (bathing, care of mouth/teeth, toileting, grooming, dressing, etc )  - Assess/evaluate cause of self-care deficits   - Assess range of motion  - Assess patient's mobility; develop plan if impaired  - Assess patient's need for assistive devices and provide as appropriate  - Encourage maximum independence but intervene and supervise when necessary  - Involve family in performance of ADLs  - Assess for home care needs following discharge   - Consider OT consult to assist with ADL evaluation and planning for discharge  - Provide patient education as appropriate  Outcome: Progressing  Goal: Maintains/Returns to pre admission functional level  Description: INTERVENTIONS:  - Perform BMAT or MOVE assessment daily    - Set and communicate daily mobility goal to care team and patient/family/caregiver     - Collaborate with rehabilitation services on mobility goals if consulted  - Out of bed for meals 3 times a day  - Out of bed for toileting  - Record patient progress and toleration of activity level   Outcome: Progressing     Problem: Potential for Falls  Goal: Patient will remain free of falls  Description: INTERVENTIONS:  - Educate patient/family on patient safety including physical limitations  - Instruct patient to call for assistance with activity   - Consult OT/PT to assist with strengthening/mobility   - Keep Call bell within reach  - Keep bed low and locked with side rails adjusted as appropriate  - Keep care items and personal belongings within reach  - Initiate and maintain comfort rounds  - Apply yellow socks and bracelet for high fall risk patients  - Consider moving patient to room near nurses station  Outcome: Progressing

## 2022-12-20 NOTE — ASSESSMENT & PLAN NOTE
Due to rhabdomyolysis      · Hepatitis panel positive for Hep C - patient will need f/u with GI   · Right upper quadrant ultrasound showing no acute findings  · LFTs continue to improve     Recent Labs     12/18/22  0525 12/19/22  0545 12/20/22  0605   * 97* 75*   * 150* 134*   TBILI 0 27 0 33 0 23   ALKPHOS 43* 45* 45*

## 2022-12-20 NOTE — ASSESSMENT & PLAN NOTE
Secondary to rhabdomyolysis in the setting of drug abuse   Peak creatinine of 8 44   · Creatinine continues to improve, down to 3 97 today  · continue IV fluids at 75 mL/hr   · Nephrology following, appreciate input    Recent Labs     12/18/22  0525 12/19/22  0545 12/20/22  0605   BUN 82* 73* 64*   CREATININE 5 55* 4 66* 3 97*   EGFR 12 15 18

## 2022-12-20 NOTE — PROGRESS NOTES
NEPHROLOGY PROGRESS NOTE   Silvano Mercado 27 y o  male MRN: [id number]  Unit/Bed#: [facility] [address] 87 203-01 Encounter: [id number]  Reason for Consult: SUSANA    ASSESSMENT AND PLAN:  73-year-old male with significant medical issues of substance abuse, hep C presented with leg pain, decreased urination  We are consulted for SUSANA management      Severe SUSANA POA, baseline creatinine 0 8-0 9  -SUSANA suspect secondary to rhabdomyolysis, ATN  -Peak creatinine 8 4 now continue to significantly improved to 3 9 today  -UA this admission showed 2+ proteinuria, consider repeat UA once renal function improved and stable  -Will change to IV 1/2 NS 50 ml/hour  -BMP in a m  Polyuria, likely secondary to post ATN diuresis  -Electrolytes are overall stable  Currently on maintenance IV fluid, change as above      Severe rhabdomyolysis, initial CK 74,000, now continue to slowly trend down with most recent 1385 today  -Continue to trend     Severe azotemia initially, now continue to improve with improving renal function      Severe transaminitis, overall slowly improving, continue to trend      Anemia of chronic disease, management as per primary team   Left lower extremity edema, venous Doppler shows no evidence of DVT  Management as per primary team      Discussed above plan in detail with primary team     SUBJECTIVE:  Patient seen and examined at bedside   No chest pain, shortness of breath, nausea, vomiting, abdominal pain    OBJECTIVE:  Current Weight: Weight - Scale: 105 kg (232 lb 3 2 oz)  Vitals:    12/20/22 0730   BP: 137/81   Pulse: 84   Resp: 14   Temp: 98 2 °F (36 8 °C)   SpO2: 98%       Intake/Output Summary (Last 24 hours) at 12/20/2022 1122  Last data filed at 12/20/2022 0700  Gross per 24 hour   Intake 3350 ml   Output 4750 ml   Net -1400 ml     Wt Readings from Last 3 Encounters:   12/20/22 105 kg (232 lb 3 2 oz)   11/12/16 81 6 kg (180 lb)   11/11/16 81 6 kg (180 lb)     Temp Readings from Last 3 Encounters:   12/20/22 98 2 °F (36 8 °C)   06/05/19 98 3 °F (36 8 °C) (Tympanic)   11/12/16 98 1 °F (36 7 °C) (Oral)     BP Readings from Last 3 Encounters:   12/20/22 137/81   06/06/19 114/56   11/12/16 135/67     Pulse Readings from Last 3 Encounters:   12/20/22 84   06/06/19 62   11/12/16 75        Physical Examination:  General: Lying in bed, no acute distress   Eyes: Mild conjunctival pallor present  ENT: External examination of ears and nose unremarkable  Neck: No obvious lymphadenopathy appreciated  Respiratory: Bilateral air entry present  CVS: S1, S2 present  GI: Soft, nondistended  CNS: Activity oriented x3  Skin: No new rash  Musculoskeletal: No obvious gross deformity noted    Medications:    Current Facility-Administered Medications:   •  heparin (porcine) subcutaneous injection 5,000 Units, 5,000 Units, Subcutaneous, Q8H [address], 5,000 Units at 12/20/22 0537 **AND** [COMPLETED] Platelet count, , , Once, Kenneth Cisneros MD  •  HYDROmorphone (DILAUDID) injection 0 5 mg, 0 5 mg, Intravenous, Q6H PRN, Kenneth Cisneros MD  •  hydrOXYzine HCL (ATARAX) tablet 25 mg, 25 mg, Oral, Q6H PRN, Yang Su PA-C, 25 mg at 12/19/22 2230  •  melatonin tablet 3 mg, 3 mg, Oral, HS, Yang Su PA-C, 3 mg at 12/19/22 2230  •  multi-electrolyte (PLASMALYTE-A/ISOLYTE-S PH 7 4) IV solution, 75 mL/hr, Intravenous, Continuous, ERNESTO Nascimento, Last Rate: 75 mL/hr at 12/20/22 0421, 75 mL/hr at 12/20/22 0421  •  nicotine (NICODERM CQ) 14 mg/24hr TD 24 hr patch 1 patch, 1 patch, Transdermal, Daily, Kenneth Cisneros MD, 1 patch at 12/20/22 4909  •  oxyCODONE (ROXICODONE) immediate release tablet 10 mg, 10 mg, Oral, Q6H PRN, Kenneth Cisneros MD, 10 mg at 12/19/22 2190  •  oxyCODONE (ROXICODONE) IR tablet 5 mg, 5 mg, Oral, Q6H PRN, Kenneth Cisneros MD, 5 mg at 12/18/22 3920    Laboratory Results:  Results from last 7 days   Lab Units 12/20/22  0605 12/19/22  0545 12/18/22  0525 12/17/22  0504 12/16/22  0457 12/15/22  0529 12/14/22  0528   WBC Thousand/uL 9 93  --   --   --   --   --  7 79   HEMOGLOBIN g/dL 8 8*  --   --   --   --   --  10 3*   HEMATOCRIT % 27 4*  --   --   --   --   --  29 9*   PLATELETS Thousands/uL 267  --   --   --   --   --  259   SODIUM mmol/L 145 142 142 142 140 137 136   POTASSIUM mmol/L 4 7 4 5 4 4 4 0 4 1 4 0 3 7   CHLORIDE mmol/L 108 105 105 103 99 99 96   CO2 mmol/L 27 27 26 26 27 26 24   BUN mg/dL 64* 73* 82* 94* 97* 104* 106*   CREATININE mg/dL 3 97* 4 66* 5 55* 6 58* 7 49* 8 31* 8 44*   CALCIUM mg/dL 8 6 8 7 8 4 8 7 8 5 8 6 8 0*   MAGNESIUM mg/dL  --   --   --   --  2 4  --   --        US right upper quadrant with liver dopplers   Final Result by Bryanna Silva MD (12/16 0802)      Normal       Workstation performed: [id number]         VAS lower limb venous duplex study, unilateral/limited   Final Result by Kong Nation MD (12/12 2135)      XR hip/pelv 2-3 vws left if performed   Final Result by Diana Owen MD (12/12 1435)      No acute osseous abnormality  Workstation performed: [id number]         CT recon only lumbar spine   Final Result by Linh Mcdonald MD (12/12 1431)      No fracture or traumatic subluxation  Bilateral L5 spondylolysis without evidence of spondylolisthesis  Mild anasarca  Minimal free fluid in the pelvis  Workstation performed: [id number]         CT abdomen pelvis wo contrast   Final Result by Linh Mcdonald MD (12/12 1428)      Significant subcutaneous edema throughout the left upper thigh with enlargement of all of the surrounding muscles suggesting infectious or posttraumatic etiology  Correlate clinically  Severe bladder wall thickening likely represent cystitis  Nonspecific free fluid in the pelvis dependently  This could be related to cystitis/infection versus trauma              Workstation performed: [id number]         CT spine cervical without contrast   Final Result by Linh Mcdonald MD (12/12 1422)      No cervical spine fracture or traumatic malalignment  Workstation performed: [id number]         CT head without contrast   Final Result by Camden Rodríguez MD (12/12 1420)      No acute intracranial abnormality  Extensive frontal scalp hematoma without underlying fracture identified  Radiopaque foreign body in the left parietal scalp region which is partially lodged in the calvarium measuring 1 1 x 0 9 cm  Workstation performed: [id number]             Portions of the record may have been created with voice recognition software  Occasional wrong word or "sound a like" substitutions may have occurred due to the inherent limitations of voice recognition software  Read the chart carefully and recognize, using context, where substitutions have occurred

## 2022-12-20 NOTE — PROGRESS NOTES
[address] [facility]  Progress Note - Blake Mart 1990, 27 y o  male MRN: [id number]  Unit/Bed#: [address] 87 203-01 Encounter: [id number]  Primary Care Provider: Denver Berber, PA-C   Date and time admitted to hospital: 12/12/2022 11:12 AM    * Rhabdomyolysis  Assessment & Plan  40-year-old male presented to institution due to left lower extremity pain after he woke up from pain as a result of " overdoing it" in the setting of drug use  · CK levels continue to downtrend  · Management per nephrology, input appreciated   · Continued on isolyte at 75 mL/hr   · Renal function continues to improve     Recent Labs     12/19/22  0545 12/20/22  0605   CKTOTAL 2,083* 1,385*         SUSANA (acute kidney injury) [facility])  Assessment & Plan  Secondary to rhabdomyolysis in the setting of drug abuse  Peak creatinine of 8 44   · Creatinine continues to improve, down to 3 97 today  · continue IV fluids at 75 mL/hr   · Nephrology following, appreciate input    Recent Labs     12/18/22  0525 12/19/22  0545 12/20/22  0605   BUN 82* 73* 64*   CREATININE 5 55* 4 66* 3 97*   EGFR 12 15 18             Hepatitis C virus infection without hepatic coma  Assessment & Plan  Will need outpatient follow-up with GI    Anemia  Assessment & Plan  Mild drop today  Suspect a component of dilutional anemia as a result of aggressive IV fluid hydration  · No evidence of acute bleeding at this time  No indication for transfusion  · Obtain anemia workup      Elevated LFTs  Assessment & Plan  Due to rhabdomyolysis      · Hepatitis panel positive for Hep C - patient will need f/u with GI   · Right upper quadrant ultrasound showing no acute findings  · LFTs continue to improve     Recent Labs     12/18/22  0525 12/19/22  0545 12/20/22  0605   * 97* 75*   * 150* 134*   TBILI 0 27 0 33 0 23   ALKPHOS 43* 45* 45*         Lower extremity edema  Assessment & Plan  · Left lower extremity greater than right   · Doppler ultrasound negative for DVT  Suspect that this is due to post trauma which resulted in his rhabdomyolysis  Improving, but still edematous   · Continue IVF for now due to above    Substance abuse [facility])  Assessment & Plan  History of IV drug abuse, UDS positive for cocaine  · Previously on Suboxone, has been off it for several days, will hold off at this time  · Remains without signs of withdrawal        VTE Pharmacologic Prophylaxis:   heparing sq     Patient Centered Rounds: I performed bedside rounds with nursing staff today  Discussions with Specialists or Other Care Team Provider: Nephrology     Education and Discussions with Family / Patient: Patient   Time Spent for Care: 30 minutes  More than 50% of total time spent on counseling and coordination of care as described above  Current Length of Stay: 8 day(s)  Current Patient Status: Inpatient   Certification Statement: The patient will continue to require additional inpatient hospital stay due to need for IVF, close monitoring   Discharge Plan: Anticipate discharge in 48-72 hrs to home  Code Status: Level 1 - Full Code    Subjective:   Patient voices no complaints, no overnight events  Objective:     Vitals:   Temp (24hrs), Av 5 °F (36 9 °C), Min:98 2 °F (36 8 °C), Max:98 9 °F (37 2 °C)    Temp:  [98 2 °F (36 8 °C)-98 9 °F (37 2 °C)] 98 2 °F (36 8 °C)  HR:  [84-87] 84  Resp:  [14-18] 14  BP: (137-140)/(71-85) 137/81  SpO2:  [97 %-98 %] 98 %  Body mass index is 35 31 kg/m²  Input and Output Summary (last 24 hours): Intake/Output Summary (Last 24 hours) at 2022 0847  Last data filed at 2022 0700  Gross per 24 hour   Intake 3350 ml   Output 4750 ml   Net -1400 ml       Physical Exam:   Physical Exam  Constitutional:       General: He is not in acute distress  HENT:      Head: Normocephalic and atraumatic  Nose: No congestion     Eyes:      Conjunctiva/sclera: Conjunctivae normal    Cardiovascular:      Rate and Rhythm: Normal rate and regular rhythm  Heart sounds: No murmur heard  Pulmonary:      Effort: No respiratory distress  Breath sounds: No wheezing or rales  Abdominal:      General: There is no distension  Tenderness: There is no abdominal tenderness  There is no guarding  Musculoskeletal:      Right lower leg: Edema present  Left lower leg: Edema present  Skin:     General: Skin is warm and dry  Neurological:      Mental Status: He is oriented to person, place, and time  Psychiatric:         Mood and Affect: Mood normal           Additional Data:     Labs:  Results from last 7 days   Lab Units 12/20/22  0605   WBC Thousand/uL 9 93   HEMOGLOBIN g/dL 8 8*   HEMATOCRIT % 27 4*   PLATELETS Thousands/uL 267   NEUTROS PCT % 67   LYMPHS PCT % 21   MONOS PCT % 7   EOS PCT % 4     Results from last 7 days   Lab Units 12/20/22  0605   SODIUM mmol/L 145   POTASSIUM mmol/L 4 7   CHLORIDE mmol/L 108   CO2 mmol/L 27   BUN mg/dL 64*   CREATININE mg/dL 3 97*   ANION GAP mmol/L 10   CALCIUM mg/dL 8 6   ALBUMIN g/dL 2 4*   TOTAL BILIRUBIN mg/dL 0 23   ALK PHOS U/L 45*   ALT U/L 134*   AST U/L 75*   GLUCOSE RANDOM mg/dL 91     Results from last 7 days   Lab Units 12/14/22  0528   INR  1 06                   Lines/Drains:  Invasive Devices     Peripheral Intravenous Line  Duration           Peripheral IV 12/20/22 Dorsal (posterior); Right Forearm <1 day                      Imaging: no new     Recent Cultures (last 7 days):         Last 24 Hours Medication List:   Current Facility-Administered Medications   Medication Dose Route Frequency Provider Last Rate   • heparin (porcine)  5,000 Units Subcutaneous Q8H [address] Abhishek Phillips MD     • HYDROmorphone  0 5 mg Intravenous Q6H PRN Uma Vivar MD     • hydrOXYzine HCL  25 mg Oral Q6H PRN Sean Norton PA-C     • melatonin  3 mg Oral HS Sean Norton PA-C     • multi-electrolyte  75 mL/hr Intravenous Continuous ERNESTO Tomas 75 mL/hr (12/20/22 0421)   • nicotine  1 patch Transdermal Daily Mike Rodriguez MD     • oxyCODONE  10 mg Oral Q6H PRN Mike Rodriguez MD     • oxyCODONE  5 mg Oral Q6H PRN Mike Rodriguez MD          Today, Patient Was Seen By: Sandra Weathers MD    **Please Note: This note may have been constructed using a voice recognition system  **

## 2022-12-20 NOTE — ASSESSMENT & PLAN NOTE
68-year-old male presented to institution due to left lower extremity pain after he woke up from pain as a result of " overdoing it" in the setting of drug use  · CK levels continue to downtrend    · Management per nephrology, input appreciated   · Continued on isolyte at 75 mL/hr   · Renal function continues to improve     Recent Labs     12/19/22  0545 12/20/22  0605   CKTOTAL 2,083* 1,385*

## 2022-12-20 NOTE — UTILIZATION REVIEW
Continued Stay Review    Date: 12/20/22                       Current Patient Class: IP Current Level of Care: MS    HPI:32 y o  male initially admitted on 12/12 w/ Rhabdo, SUSANA, substance abuse    Assessment/Plan:   VS stable, Hgb down to 8 8 - no evidence of bleeding  CK, BUN/Cr and ALT trending downward  IV fluids continue today but changing to  45 NaCl at 50 ml/hr today  Holding Suboxone  Lytes are stable and improving renal function  Venous duplex negative for DVT, has improving BLE edema  Has + Hep C panel - will need OP f/u  On exam still with BLE edema  No abd pain        Vital Signs:   12/20/22 0736 -- -- -- -- -- -- None (Room air) --   12/20/22 07:30:26 98 2 °F (36 8 °C) 84 14 137/81 100 98 % -- --   12/19/22 21:18:46 98 9 °F (37 2 °C) 87 18 140/85 103 97 % None (Room air) --   12/19/22 14:45:55 98 4 °F (36 9 °C) 87 16 138/71 93 98 % None (Room air) Lying   12/19/22 0810 -- -- -- -- -- 97 % None (Room air) --   12/19/22 07:27:09 96 9 °F (36 1 °C) Abnormal  81 14 130/71 91 97 % None (Room air) Lying   12/18/22 22:32:22 99 4 °F (37 4 °C) 98 18 148/80 103 100 % -- --   12/18/22 15:21:43 98 1 °F (36 7 °C) 79 18 125/75 92 100 % -- --   12/18/22 0910 -- -- -- -- -- 96 % None (Room air) --   12/18/22 08:09:52 97 °F (36 1 °C) Abnormal  78 18 145/97 113 100 % None (Room air) Lying     Pertinent Labs/Diagnostic Results:       Results from last 7 days   Lab Units 12/20/22  0605 12/14/22  0528   WBC Thousand/uL 9 93 7 79   HEMOGLOBIN g/dL 8 8* 10 3*   HEMATOCRIT % 27 4* 29 9*   PLATELETS Thousands/uL 267 259   NEUTROS ABS Thousands/µL 6 57  --          Results from last 7 days   Lab Units 12/20/22  0605 12/19/22  0545 12/18/22  0525 12/17/22  0504 12/16/22  0457   SODIUM mmol/L 145 142 142 142 140   POTASSIUM mmol/L 4 7 4 5 4 4 4 0 4 1   CHLORIDE mmol/L 108 105 105 103 99   CO2 mmol/L 27 27 26 26 27   ANION GAP mmol/L 10 10 11 13 14*   BUN mg/dL 64* 73* 82* 94* 97*   CREATININE mg/dL 3 97* 4 66* 5 55* 6 58* 7 49*   EGFR ml/min/1 73sq m 18 15 12 10 8   CALCIUM mg/dL 8 6 8 7 8 4 8 7 8 5   MAGNESIUM mg/dL  --   --   --   --  2 4     Results from last 7 days   Lab Units 12/20/22  0605 12/19/22  0545 12/18/22  0525 12/17/22  0504 12/16/22  0457   AST U/L 75* 97* 122* 186* 286*   ALT U/L 134* 150* 170* 212* 257*   ALK PHOS U/L 45* 45* 43* 47 47   TOTAL PROTEIN g/dL 5 6* 5 6* 5 4* 5 7* 5 8*   ALBUMIN g/dL 2 4* 2 4* 2 3* 2 4* 2 5*   TOTAL BILIRUBIN mg/dL 0 23 0 33 0 27 0 33 0 35         Results from last 7 days   Lab Units 12/20/22  0605 12/19/22  0545 12/18/22  0525 12/17/22  0504 12/16/22  0457 12/15/22  0529 12/14/22  0528   GLUCOSE RANDOM mg/dL 91 87 94 106 98 102 128     Results from last 7 days   Lab Units 12/20/22  0605 12/19/22  0545 12/17/22  0504   CK TOTAL U/L 1,385* 2,083* 6,099*   CK MB INDEX % <1 0 <1 0 <1 0   CK MB ng/mL 7 0* 6 4* 5 3*             Results from last 7 days   Lab Units 12/14/22  0528   PROTIME seconds 13 9   INR  1 06     Results from last 7 days   Lab Units 12/16/22  0457   HEP B S AG  Non-reactive   HEP C AB  High Reactive*   HEP B C IGM  Non-reactive     Medications:   Scheduled Medications:  heparin (porcine), 5,000 Units, Subcutaneous, Q8H [facility]  melatonin, 3 mg, Oral, HS  nicotine, 1 patch, Transdermal, Daily      Continuous IV Infusions:  sodium chloride, 50 mL/hr, Intravenous, Continuous      PRN Meds:  HYDROmorphone, 0 5 mg, Intravenous, Q6H PRN  hydrOXYzine HCL, 25 mg, Oral, Q6H PRN - x 2 12/19  oxyCODONE, 10 mg, Oral, Q6H PRN -x 1 1 12/19  oxyCODONE, 5 mg, Oral, Q6H PRN      Discharge Plan: TBD    Network Utilization Review Department  ATTENTION: Please call with any questions or concerns to [phone] and carefully listen to the prompts so that you are directed to the right person   All voicemails are confidential   [facility] all requests for admission clinical reviews, approved or denied determinations and any other requests to dedicated fax number below belonging to the campus where the patient is receiving treatment   List of dedicated fax numbers for the Facilities:  [address] [place] [address] DENIALS (Administrative/Medical Necessity) [phone]   [address]  (Maternity/NICU/Pediatrics) [phone]   [address] [phone]   [address] [phone]   [address] [place] [address] [phone]   [address] [facility] [address] [facility] [phone]

## 2022-12-20 NOTE — ASSESSMENT & PLAN NOTE
Mild drop today  Suspect a component of dilutional anemia as a result of aggressive IV fluid hydration  · No evidence of acute bleeding at this time  No indication for transfusion     · Obtain anemia workup

## 2022-12-20 NOTE — ASSESSMENT & PLAN NOTE
· Left lower extremity greater than right   · Doppler ultrasound negative for DVT  Suspect that this is due to post trauma which resulted in his rhabdomyolysis   Improving, but still edematous   · Continue IVF for now due to above

## 2022-12-20 NOTE — ASSESSMENT & PLAN NOTE
History of IV drug abuse, UDS positive for cocaine  · Previously on Suboxone, has been off it for several days, will hold off at this time  · Remains without signs of withdrawal

## 2022-12-20 NOTE — PLAN OF CARE
Problem: PAIN - ADULT  Goal: Verbalizes/displays adequate comfort level or baseline comfort level  Description: Interventions:  - Encourage patient to monitor pain and request assistance  - Assess pain using appropriate pain scale  - Administer analgesics based on type and severity of pain and evaluate response  - Implement non-pharmacological measures as appropriate and evaluate response  - Consider cultural and social influences on pain and pain management  - Notify physician/advanced practitioner if interventions unsuccessful or patient reports new pain  Outcome: Progressing     Problem: INFECTION - ADULT  Goal: Absence or prevention of progression during hospitalization  Description: INTERVENTIONS:  - Assess and monitor for signs and symptoms of infection  - Monitor lab/diagnostic results  - Monitor all insertion sites, i e  indwelling lines, tubes, and drains  - Monitor endotracheal if appropriate and nasal secretions for changes in amount and color  - [place] appropriate cooling/warming therapies per order  - Administer medications as ordered  - Instruct and encourage patient and family to use good hand hygiene technique  - Identify and instruct in appropriate isolation precautions for identified infection/condition  Outcome: Progressing     Problem: SAFETY ADULT  Goal: Patient will remain free of falls  Description: INTERVENTIONS:  - Educate patient/family on patient safety including physical limitations  - Instruct patient to call for assistance with activity   - Consult OT/PT to assist with strengthening/mobility   - Keep Call bell within reach  - Keep bed low and locked with side rails adjusted as appropriate  - Keep care items and personal belongings within reach  - Initiate and maintain comfort rounds  - Apply yellow socks and bracelet for high fall risk patients  - Consider moving patient to room near nurses station  Outcome: Progressing  Goal: Maintain or return to baseline ADL function  Description: INTERVENTIONS:  -  Assess patient's ability to carry out ADLs; assess patient's baseline for ADL function and identify physical deficits which impact ability to perform ADLs (bathing, care of mouth/teeth, toileting, grooming, dressing, etc )  - Assess/evaluate cause of self-care deficits   - Assess range of motion  - Assess patient's mobility; develop plan if impaired  - Assess patient's need for assistive devices and provide as appropriate  - Encourage maximum independence but intervene and supervise when necessary  - Involve family in performance of ADLs  - Assess for home care needs following discharge   - Consider OT consult to assist with ADL evaluation and planning for discharge  - Provide patient education as appropriate  Outcome: Progressing  Goal: Maintains/Returns to pre admission functional level  Description: INTERVENTIONS:  - Perform BMAT or MOVE assessment daily    - Set and communicate daily mobility goal to care team and patient/family/caregiver     - Collaborate with rehabilitation services on mobility goals if consulted  - Out of bed for meals 3 times a day  - Out of bed for toileting  - Record patient progress and toleration of activity level   Outcome: Progressing     Problem: DISCHARGE PLANNING  Goal: Discharge to home or other facility with appropriate resources  Description: INTERVENTIONS:  - Identify barriers to discharge w/patient and caregiver  - Arrange for needed discharge resources and transportation as appropriate  - Identify discharge learning needs (meds, wound care, etc )  - Arrange for interpretive services to assist at discharge as needed  - Refer to Case Management Department for coordinating discharge planning if the patient needs post-hospital services based on physician/advanced practitioner order or complex needs related to functional status, cognitive ability, or social support system  Outcome: Progressing     Problem: Knowledge Deficit  Goal: Patient/family/caregiver demonstrates understanding of disease process, treatment plan, medications, and discharge instructions  Description: Complete learning assessment and assess knowledge base    Interventions:  - Provide teaching at level of understanding  - Provide teaching via preferred learning methods  Outcome: Progressing     Problem: METABOLIC, FLUID AND ELECTROLYTES - ADULT  Goal: Electrolytes maintained within normal limits  Description: INTERVENTIONS:  - Monitor labs and assess patient for signs and symptoms of electrolyte imbalances  - Administer electrolyte replacement as ordered  - Monitor response to electrolyte replacements, including repeat lab results as appropriate  - Instruct patient on fluid and nutrition as appropriate  Outcome: Progressing  Goal: Fluid balance maintained  Description: INTERVENTIONS:  - Monitor labs   - Monitor I/O and WT  - Instruct patient on fluid and nutrition as appropriate  - Assess for signs & symptoms of volume excess or deficit  Outcome: Progressing     Problem: MOBILITY - ADULT  Goal: Maintain or return to baseline ADL function  Description: INTERVENTIONS:  -  Assess patient's ability to carry out ADLs; assess patient's baseline for ADL function and identify physical deficits which impact ability to perform ADLs (bathing, care of mouth/teeth, toileting, grooming, dressing, etc )  - Assess/evaluate cause of self-care deficits   - Assess range of motion  - Assess patient's mobility; develop plan if impaired  - Assess patient's need for assistive devices and provide as appropriate  - Encourage maximum independence but intervene and supervise when necessary  - Involve family in performance of ADLs  - Assess for home care needs following discharge   - Consider OT consult to assist with ADL evaluation and planning for discharge  - Provide patient education as appropriate  Outcome: Progressing  Goal: Maintains/Returns to pre admission functional level  Description: INTERVENTIONS:  - Perform BMAT or MOVE assessment daily    - Set and communicate daily mobility goal to care team and patient/family/caregiver     - Collaborate with rehabilitation services on mobility goals if consulted  - Out of bed for meals 3 times a day  - Out of bed for toileting  - Record patient progress and toleration of activity level   Outcome: Progressing     Problem: Potential for Falls  Goal: Patient will remain free of falls  Description: INTERVENTIONS:  - Educate patient/family on patient safety including physical limitations  - Instruct patient to call for assistance with activity   - Consult OT/PT to assist with strengthening/mobility   - Keep Call bell within reach  - Keep bed low and locked with side rails adjusted as appropriate  - Keep care items and personal belongings within reach  - Initiate and maintain comfort rounds  - Apply yellow socks and bracelet for high fall risk patients  - Consider moving patient to room near nurses station  Outcome: Progressing

## 2022-12-21 VITALS
SYSTOLIC BLOOD PRESSURE: 132 MMHG | HEART RATE: 75 BPM | DIASTOLIC BLOOD PRESSURE: 73 MMHG | BODY MASS INDEX: 35.18 KG/M2 | HEIGHT: 68 IN | TEMPERATURE: 97.8 F | OXYGEN SATURATION: 99 % | WEIGHT: 232.14 LBS | RESPIRATION RATE: 18 BRPM

## 2022-12-21 LAB
ANION GAP SERPL CALCULATED.3IONS-SCNC: 9 MMOL/L (ref 4–13)
BUN SERPL-MCNC: 57 MG/DL (ref 5–25)
CALCIUM SERPL-MCNC: 8.7 MG/DL (ref 8.3–10.1)
CHLORIDE SERPL-SCNC: 107 MMOL/L (ref 96–108)
CK MB SERPL-MCNC: 8.5 NG/ML (ref 0–5)
CK MB SERPL-MCNC: <1 % (ref 0–2.5)
CK SERPL-CCNC: 1040 U/L (ref 39–308)
CO2 SERPL-SCNC: 26 MMOL/L (ref 21–32)
CREAT SERPL-MCNC: 3.27 MG/DL (ref 0.6–1.3)
FERRITIN SERPL-MCNC: 144 NG/ML (ref 8–388)
GFR SERPL CREATININE-BSD FRML MDRD: 23 ML/MIN/1.73SQ M
GLUCOSE SERPL-MCNC: 84 MG/DL (ref 65–140)
HCT VFR BLD AUTO: 27.4 % (ref 36.5–49.3)
HGB BLD-MCNC: 8.8 G/DL (ref 12–17)
IRON SATN MFR SERPL: 35 % (ref 20–50)
IRON SERPL-MCNC: 88 UG/DL (ref 65–175)
POTASSIUM SERPL-SCNC: 5.2 MMOL/L (ref 3.5–5.3)
SODIUM SERPL-SCNC: 142 MMOL/L (ref 135–147)
TIBC SERPL-MCNC: 251 UG/DL (ref 250–450)
VIT B12 SERPL-MCNC: 512 PG/ML (ref 100–900)

## 2022-12-21 RX ORDER — NICOTINE 21 MG/24HR
1 PATCH, TRANSDERMAL 24 HOURS TRANSDERMAL DAILY
Qty: 28 PATCH | Refills: 0 | Status: SHIPPED | OUTPATIENT
Start: 2022-12-22

## 2022-12-21 RX ADMIN — SODIUM CHLORIDE 50 ML/HR: 4.5 INJECTION, SOLUTION INTRAVENOUS at 10:09

## 2022-12-21 RX ADMIN — HEPARIN SODIUM 5000 UNITS: 5000 INJECTION INTRAVENOUS; SUBCUTANEOUS at 05:02

## 2022-12-21 RX ADMIN — Medication 1 PATCH: at 08:10

## 2022-12-21 NOTE — PROGRESS NOTES
NEPHROLOGY PROGRESS NOTE   Gary Trivedi 27 y o  male MRN: [id number]  [facility] [address] Encounter: [id number]  Reason for Consult: SUSANA    ASSESSMENT AND PLAN:  61-year-old male with significant medical issues of substance abuse, hep C presented with leg pain, decreased urination   We are consulted for SUSANA management      Severe SUSANA POA, baseline creatinine 0 8-0 9  -SUSANA suspect secondary to rhabdomyolysis, ATN  -Peak creatinine 8 4 now continue to significantly improved to 3 2 today  -UA this admission showed 2+ proteinuria, consider repeat UA once renal function improved and stable   -Overall has good p o  intake  Will discontinue IV fluid    -If he gets discharged, would recommend to be BMP early next week and close outpatient nephrology follow-up      Polyuria, likely secondary to post ATN diuresis  -Electrolytes are overall stable  He has been on maintenance IV fluid, plan to discontinue IV fluid  Has good p o  intake   -No urine output charted for last 24 hours    Severe rhabdomyolysis, initial CK 74,000, now continue to slowly trend down with most recent 1385 today  -Continue to trend     Severe azotemia initially, now continue to improve with improving renal function      Severe transaminitis, overall slowly improving, continue to trend      Anemia of chronic disease, management as per primary team   Left lower extremity edema, venous Doppler shows no evidence of DVT   Management as per primary team   Overall stable and somewhat improving     Discussed above plan in detail with primary team     SUBJECTIVE:  Patient seen and examined at bedside  Overall feels well  Denies any nausea, vomiting, chest pain, shortness of breath      OBJECTIVE:  Current Weight: Weight - Scale: 105 kg (232 lb 2 3 oz)  Vitals:    12/21/22 0738   BP: 132/73   Pulse: 75   Resp: 18   Temp: 97 8 °F (36 6 °C)   SpO2: 99%     No intake or output data in the 24 hours ending 12/21/22 1016  Wt Readings from Last 3 Encounters: 12/21/22 105 kg (232 lb 2 3 oz)   11/12/16 81 6 kg (180 lb)   11/11/16 81 6 kg (180 lb)     Temp Readings from Last 3 Encounters:   12/21/22 97 8 °F (36 6 °C) (Oral)   06/05/19 98 3 °F (36 8 °C) (Tympanic)   11/12/16 98 1 °F (36 7 °C) (Oral)     BP Readings from Last 3 Encounters:   12/21/22 132/73   06/06/19 114/56   11/12/16 135/67     Pulse Readings from Last 3 Encounters:   12/21/22 75   06/06/19 62   11/12/16 75        Physical Examination:  General: Lying in bed, no acute distress   Eyes: Mild conjunctival pallor present  ENT: External examination of ears and nose unremarkable  Neck: No obvious lymphadenopathy appreciated  Respiratory: Bilateral air entry present  CVS: S1, S2 present  GI:, Nondistended  CNS: Alert oriented x3  Skin: No new rash  Musculoskeletal: No obvious new gross deformity noted    Medications:    Current Facility-Administered Medications:   •  heparin (porcine) subcutaneous injection 5,000 Units, 5,000 Units, Subcutaneous, Q8H [address], 5,000 Units at 12/21/22 0502 **AND** [COMPLETED] Platelet count, , , Once, Kenneth Cisneros MD  •  HYDROmorphone (DILAUDID) injection 0 5 mg, 0 5 mg, Intravenous, Q6H PRN, Kenneth Cisneros MD  •  hydrOXYzine HCL (ATARAX) tablet 25 mg, 25 mg, Oral, Q6H PRN, Yang Su PA-C, 25 mg at 12/20/22 2107  •  melatonin tablet 3 mg, 3 mg, Oral, HS, Yang Su PA-C, 3 mg at 12/20/22 2107  •  nicotine (NICODERM CQ) 14 mg/24hr TD 24 hr patch 1 patch, 1 patch, Transdermal, Daily, Kenneth Cisneros MD, 1 patch at 12/21/22 0810  •  oxyCODONE (ROXICODONE) immediate release tablet 10 mg, 10 mg, Oral, Q6H PRN, Kenneth Cisneros MD, 10 mg at 12/19/22 2230  •  oxyCODONE (ROXICODONE) IR tablet 5 mg, 5 mg, Oral, Q6H PRN, Kenneth Cisneros MD, 5 mg at 12/20/22 2333  •  sodium chloride infusion 0 45 %, 50 mL/hr, Intravenous, Continuous, Sebas Gilbert MD, Last Rate: 50 mL/hr at 12/21/22 1009, 50 mL/hr at 12/21/22 1009    Laboratory Results:  Results from last 7 days   Lab Units 12/21/22  0516 12/20/22  8140 12/19/22  0545 12/18/22  0525 12/17/22  0504 12/16/22  0457 12/15/22  0529   WBC Thousand/uL  --  9 93  --   --   --   --   --    HEMOGLOBIN g/dL  --  8 8*  8 8*  --   --   --   --   --    HEMATOCRIT %  --  27 4*  27 4*  --   --   --   --   --    PLATELETS Thousands/uL  --  267  --   --   --   --   --    SODIUM mmol/L 142 145 142 142 142 140 137   POTASSIUM mmol/L 5 2 4 7 4 5 4 4 4 0 4 1 4 0   CHLORIDE mmol/L 107 108 105 105 103 99 99   CO2 mmol/L 26 27 27 26 26 27 26   BUN mg/dL 57* 64* 73* 82* 94* 97* 104*   CREATININE mg/dL 3 27* 3 97* 4 66* 5 55* 6 58* 7 49* 8 31*   CALCIUM mg/dL 8 7 8 6 8 7 8 4 8 7 8 5 8 6   MAGNESIUM mg/dL  --   --   --   --   --  2 4  --        US right upper quadrant with liver dopplers   Final Result by Keila Villatoro MD (12/16 0802)      Normal       Workstation performed: [id number]         VAS lower limb venous duplex study, unilateral/limited   Final Result by Mala Salas MD (12/12 2135)      XR hip/pelv 2-3 vws left if performed   Final Result by Meliza Gordon MD (12/12 1435)      No acute osseous abnormality  Workstation performed: [id number]         CT recon only lumbar spine   Final Result by Rachel Rogers MD (12/12 1431)      No fracture or traumatic subluxation  Bilateral L5 spondylolysis without evidence of spondylolisthesis  Mild anasarca  Minimal free fluid in the pelvis  Workstation performed: [id number]         CT abdomen pelvis wo contrast   Final Result by Rachel Rogers MD (12/12 1428)      Significant subcutaneous edema throughout the left upper thigh with enlargement of all of the surrounding muscles suggesting infectious or posttraumatic etiology  Correlate clinically  Severe bladder wall thickening likely represent cystitis  Nonspecific free fluid in the pelvis dependently  This could be related to cystitis/infection versus trauma              Workstation performed: [id number] CT spine cervical without contrast   Final Result by Linh Mcdonald MD (12/12 1422)      No cervical spine fracture or traumatic malalignment  Workstation performed: [id number]         CT head without contrast   Final Result by Linh Mcdonald MD (12/12 1420)      No acute intracranial abnormality  Extensive frontal scalp hematoma without underlying fracture identified  Radiopaque foreign body in the left parietal scalp region which is partially lodged in the calvarium measuring 1 1 x 0 9 cm  Workstation performed: [id number]             Portions of the record may have been created with voice recognition software  Occasional wrong word or "sound a like" substitutions may have occurred due to the inherent limitations of voice recognition software  Read the chart carefully and recognize, using context, where substitutions have occurred

## 2022-12-21 NOTE — ASSESSMENT & PLAN NOTE
Mild drop today  Suspect a component of dilutional anemia as a result of aggressive IV fluid hydration  · No evidence of acute bleeding at this time  No indication for transfusion     · Outpatient monitoring

## 2022-12-21 NOTE — CASE MANAGEMENT
Case Management Discharge Planning Note    Patient name Nick Capac  Location [place] [address] Dalila Nose* MRN [id number]  : 1990 Date 2022       Current Admission Date: 2022  Current Admission Diagnosis:Rhabdomyolysis   Patient Active Problem List    Diagnosis Date Noted   • Hepatitis C virus infection without hepatic coma 2022   • Anemia 2022   • Rhabdomyolysis 2022   • SUSANA (acute kidney injury) ([facility] [address] ) 2022   • Substance abuse ([facility] [address] ) 2022   • Lower extremity edema 2022   • Elevated LFTs 2022      LOS (days): 9  Geometric Mean LOS (GMLOS) (days):   Days to GMLOS:     OBJECTIVE:  Risk of Unplanned Readmission Score: 19 6         Current admission status: Inpatient   Preferred Pharmacy:   [address], [address] [facility] [address] [address]  Phone: [phone] Fax: [phone]    Primary Care Provider: Benito Bragg PA-C    Primary Insurance: [facility] [place] [facility]  Secondary Insurance:     DISCHARGE DETAILS:    Discharge planning discussed with[de-identified] patient and HOST rep Arti Yanique [phone]  Freedom of Choice: Yes  Comments - Freedom of Choice: HOST rep Arti Yanique [phone] will contact pt and set up appointment for pt with [address]         Is the patient interested in [facility] at discharge?: No    DME Referral Provided  Referral made for DME?: No    Other Referral/Resources/Interventions Provided:  Interventions: Substance Abuse Treatment  Referral Comments: HOST rep Arti Yanique [phone] will contact pt and set up appointment for pt with [facility]  Treatment Team Recommendation: Home  Discharge Destination Plan[de-identified] Home  Transport at Discharge : Automobile      Additional Comments: HOST rep Arti Yanique [phone] will contact pt and set up appointment for pt with [facility]     UPDATE: Patient requested going to [address] [place] [address]  55 Nakita Munoz CM suggested LYFT vs  walking due to very low outdoor temperatures and no heavy jacket  Patient agreeable  LYFT arrival at 1500 > patient and RN aware

## 2022-12-21 NOTE — NURSING NOTE
After patient was discharged, patient's mother called the unit inquiring about her sons discharge  She questioned whether or not he left AMA or was discharged  This nurse informed the patients mother, that the patient was medically cleared for discharge, but did have some follow up appointments that he would need to schedule  Patients mother verbalized understanding and stated that she wanted to double check because the patient said he was discharged, but the mother did not believe him

## 2022-12-21 NOTE — ASSESSMENT & PLAN NOTE
Due to rhabdomyolysis      · Hepatitis panel positive for Hep C - patient will need f/u with GI   · Right upper quadrant ultrasound showing no acute findings  · LFTs continue to improve       Recent Labs     12/19/22  0545 12/20/22  0605   AST 97* 75*   * 134*   TBILI 0 33 0 23   ALKPHOS 45* 45*

## 2022-12-21 NOTE — ASSESSMENT & PLAN NOTE
Secondary to rhabdomyolysis in the setting of drug abuse   Peak creatinine of 8 44   · Creatinine continues to improve, down to 3 27 today  · Patient maintaining adequate oral fluid intake  · Per my discussion with nephrology the patient is appropriate for discharge  · He will follow-up with nephrology on outpatient basis and obtain a BMP in 1 week    Recent Labs     12/19/22  0545 12/20/22  0605 12/21/22  0516   BUN 73* 64* 57*   CREATININE 4 66* 3 97* 3 27*   EGFR 15 18 23

## 2022-12-21 NOTE — DISCHARGE SUMMARY
[facility]  Discharge- Fayrene [place] 1990, 27 y o  male MRN: [id number]  Unit/Bed#: [id number] Encounter: [id number]  Primary Care Provider: Douglas Oliveira PA-C   Date and time admitted to hospital: 12/12/2022 11:12 AM    * Rhabdomyolysis  Assessment & Plan  68-year-old male presented to institution due to left lower extremity pain after he woke up from pain as a result of " overdoing it" in the setting of drug use  · CK levels continue to downtrend  · Received generous IV fluids  · Renal function continues to improve   · Per my discussion with nephrology the patient is appropriate for discharge home  · He will obtain follow-up blood work on outpatient basis and is referred for an outpatient follow-up appointment with nephrology  · He is referred to a HOST program by case management to help with his substance dependence      Recent Labs     12/19/22  0545 12/20/22  0605 12/21/22  1005   CKTOTAL 2,083* 1,385* 1,040*         SUSANA (acute kidney injury) ([facility] [address] )  Assessment & Plan  Secondary to rhabdomyolysis in the setting of drug abuse  Peak creatinine of 8 44   · Creatinine continues to improve, down to 3 27 today  · Patient maintaining adequate oral fluid intake  · Per my discussion with nephrology the patient is appropriate for discharge  · He will follow-up with nephrology on outpatient basis and obtain a BMP in 1 week    Recent Labs     12/19/22  0545 12/20/22  0605 12/21/22  0516   BUN 73* 64* 57*   CREATININE 4 66* 3 97* 3 27*   EGFR 15 18 23             Hepatitis C virus infection without hepatic coma  Assessment & Plan  Will need outpatient follow-up with GI, referral placed     Anemia  Assessment & Plan  Mild drop today  Suspect a component of dilutional anemia as a result of aggressive IV fluid hydration  · No evidence of acute bleeding at this time  No indication for transfusion  · Outpatient monitoring    Elevated LFTs  Assessment & Plan  Due to rhabdomyolysis  · Hepatitis panel positive for Hep C - patient will need f/u with GI   · Right upper quadrant ultrasound showing no acute findings  · LFTs continue to improve       Recent Labs     12/19/22  0545 12/20/22  0605   AST 97* 75*   * 134*   TBILI 0 33 0 23   ALKPHOS 45* 45*         Lower extremity edema  Assessment & Plan  · Left lower extremity greater than right   · Doppler ultrasound negative for DVT  Suspect that this is due to post trauma which resulted in his rhabdomyolysis  Improving, but still edematous   · Anticipate improvement with increase ambulation upon discharge     Substance abuse ([address] )  Assessment & Plan  History of IV drug abuse, UDS positive for cocaine  · Previously on Suboxone, has been off it for several days, will hold off at this time  · Remains without signs of withdrawal  · Referred to HOST program         Medical Problems     Resolved Problems  Date Reviewed: 12/21/2022   None       Discharging Physician / Practitioner: Tricia Dacosta MD  PCP: Denver Berber, PA-C  Admission Date:   Admission Orders (From admission, onward)     Ordered        12/12/22 [place]  Once                      Discharge Date: 12/21/22    Consultations During Hospital Stay:  · Nephrology     Procedures Performed:   US right upper quadrant with liver dopplers   Final Result by Vanessa Ladd MD (12/16 0802)      Normal       Workstation performed: [id number]         VAS lower limb venous duplex study, unilateral/limited   Final Result by Ashwin Joshua MD (12/12 2135)      XR hip/pelv 2-3 vws left if performed   Final Result by Suzanne Khoury MD (12/12 1435)      No acute osseous abnormality  Workstation performed: [id number]         CT recon only lumbar spine   Final Result by Luna Wright MD (12/12 1431)      No fracture or traumatic subluxation  Bilateral L5 spondylolysis without evidence of spondylolisthesis  Mild anasarca    Minimal free fluid in the pelvis  Workstation performed: [id number]         CT abdomen pelvis wo contrast   Final Result by Shama Aguilar MD (12/12 1428)      Significant subcutaneous edema throughout the left upper thigh with enlargement of all of the surrounding muscles suggesting infectious or posttraumatic etiology  Correlate clinically  Severe bladder wall thickening likely represent cystitis  Nonspecific free fluid in the pelvis dependently  This could be related to cystitis/infection versus trauma  Workstation performed: [id number]         CT spine cervical without contrast   Final Result by Shama Aguilar MD (12/12 1422)      No cervical spine fracture or traumatic malalignment  Workstation performed: [id number]         CT head without contrast   Final Result by Shama Aguilar MD (12/12 1420)      No acute intracranial abnormality  Extensive frontal scalp hematoma without underlying fracture identified  Radiopaque foreign body in the left parietal scalp region which is partially lodged in the calvarium measuring 1 1 x 0 9 cm  Workstation performed: [id number]               Significant Findings / Test Results:   Results from last 7 days   Lab Units 12/20/22  0605   WBC Thousand/uL 9 93   HEMOGLOBIN g/dL 8 8*  8 8*   HEMATOCRIT % 27 4*  27 4*   PLATELETS Thousands/uL 267     Results from last 7 days   Lab Units 12/21/22  0516   SODIUM mmol/L 142   POTASSIUM mmol/L 5 2   CHLORIDE mmol/L 107   CO2 mmol/L 26   BUN mg/dL 57*   CREATININE mg/dL 3 27*   CALCIUM mg/dL 8 7         Test Results Pending at Discharge (will require follow up): · None     Outpatient Tests Requested:  · BMP    Complications:  None    Reason for Admission: Left leg pain    Hospital Course:   Liliya Clinton is a 27 y o  male patient who originally presented to the hospital on 12/12/2022 due to left leg pain    The patient was found to have severe rhabdomyolysis associated with acute kidney injury  This was attributed to his ongoing substance abuse  The patient received generous IV fluids with significant improvement of his rhabdomyolysis and kidney injury  Prior to discharge a venous duplex was obtained and a DVT was ruled out  The patient was discharged in improved and stable condition  He was recommended to follow-up with his PCP, and nephrology  Due to associated elevated LFTs, a hepatitis panel was obtained and hepatitis C virus was detected  The patient was referred to GI in regards to this finding  The patient was discharged in improved and stable condition  He was referred to Brenda Perales of care was discussed with the patient he was in agreement with the plan  Please see above list of diagnoses and related plan for additional information  Condition at Discharge: stable    Discharge Day Visit / Exam:   Subjective: Patient seen and examined  He reports feeling well and would like to be discharged home  No complaints, no overnight events  Vitals: Blood Pressure: 132/73 (12/21/22 0738)  Pulse: 75 (12/21/22 0738)  Temperature: 97 8 °F (36 6 °C) (12/21/22 0738)  Temp Source: Oral (12/21/22 0738)  Respirations: 18 (12/21/22 0738)  Height: 5' 8" (172 7 cm) (12/12/22 1648)  Weight - Scale: 105 kg (232 lb 2 3 oz) (12/21/22 0600)  SpO2: 99 % (12/21/22 0738)    Exam:     Physical Exam  Constitutional:       General: He is not in acute distress  HENT:      Head: Normocephalic and atraumatic  Nose: No congestion  Eyes:      Conjunctiva/sclera: Conjunctivae normal    Cardiovascular:      Rate and Rhythm: Normal rate and regular rhythm  Pulmonary:      Effort: No respiratory distress  Breath sounds: No wheezing or rales  Musculoskeletal:      Left lower leg: Edema present  Skin:     General: Skin is warm and dry  Neurological:      Mental Status: He is oriented to person, place, and time     Psychiatric:         Mood and Affect: Mood normal  Discharge instructions/Information to patient and family:   See after visit summary for information provided to patient and family  Provisions for Follow-Up Care:  See after visit summary for information related to follow-up care and any pertinent home health orders  Disposition:   Home    Planned Readmission: No     Discharge Statement:  I spent 35 minutes discharging the patient  This time was spent on the day of discharge  I had direct contact with the patient on the day of discharge  Greater than 50% of the total time was spent examining patient, answering all patient questions, arranging and discussing plan of care with patient as well as directly providing post-discharge instructions  Additional time then spent on discharge activities  Discharge Medications:  See after visit summary for reconciled discharge medications provided to patient and/or family        **Please Note: This note may have been constructed using a voice recognition system**

## 2022-12-21 NOTE — PLAN OF CARE
Problem: PAIN - ADULT  Goal: Verbalizes/displays adequate comfort level or baseline comfort level  Description: Interventions:  - Encourage patient to monitor pain and request assistance  - Assess pain using appropriate pain scale  - Administer analgesics based on type and severity of pain and evaluate response  - Implement non-pharmacological measures as appropriate and evaluate response  - Consider cultural and social influences on pain and pain management  - Notify physician/advanced practitioner if interventions unsuccessful or patient reports new pain  Outcome: Progressing     Problem: INFECTION - ADULT  Goal: Absence or prevention of progression during hospitalization  Description: INTERVENTIONS:  - Assess and monitor for signs and symptoms of infection  - Monitor lab/diagnostic results  - Monitor all insertion sites, i e  indwelling lines, tubes, and drains  - Monitor endotracheal if appropriate and nasal secretions for changes in amount and color  - [place] appropriate cooling/warming therapies per order  - Administer medications as ordered  - Instruct and encourage patient and family to use good hand hygiene technique  - Identify and instruct in appropriate isolation precautions for identified infection/condition  Outcome: Progressing     Problem: SAFETY ADULT  Goal: Patient will remain free of falls  Description: INTERVENTIONS:  - Educate patient/family on patient safety including physical limitations  - Instruct patient to call for assistance with activity   - Consult OT/PT to assist with strengthening/mobility   - Keep Call bell within reach  - Keep bed low and locked with side rails adjusted as appropriate  - Keep care items and personal belongings within reach  - Initiate and maintain comfort rounds  - Apply yellow socks and bracelet for high fall risk patients  - Consider moving patient to room near nurses station  Outcome: Progressing  Goal: Maintain or return to baseline ADL function  Description: INTERVENTIONS:  -  Assess patient's ability to carry out ADLs; assess patient's baseline for ADL function and identify physical deficits which impact ability to perform ADLs (bathing, care of mouth/teeth, toileting, grooming, dressing, etc )  - Assess/evaluate cause of self-care deficits   - Assess range of motion  - Assess patient's mobility; develop plan if impaired  - Assess patient's need for assistive devices and provide as appropriate  - Encourage maximum independence but intervene and supervise when necessary  - Involve family in performance of ADLs  - Assess for home care needs following discharge   - Consider OT consult to assist with ADL evaluation and planning for discharge  - Provide patient education as appropriate  Outcome: Progressing  Goal: Maintains/Returns to pre admission functional level  Description: INTERVENTIONS:  - Perform BMAT or MOVE assessment daily    - Set and communicate daily mobility goal to care team and patient/family/caregiver     - Collaborate with rehabilitation services on mobility goals if consulted  - Out of bed for meals 3 times a day  - Out of bed for toileting  - Record patient progress and toleration of activity level   Outcome: Progressing     Problem: DISCHARGE PLANNING  Goal: Discharge to home or other facility with appropriate resources  Description: INTERVENTIONS:  - Identify barriers to discharge w/patient and caregiver  - Arrange for needed discharge resources and transportation as appropriate  - Identify discharge learning needs (meds, wound care, etc )  - Arrange for interpretive services to assist at discharge as needed  - Refer to Case Management Department for coordinating discharge planning if the patient needs post-hospital services based on physician/advanced practitioner order or complex needs related to functional status, cognitive ability, or social support system  Outcome: Progressing     Problem: Knowledge Deficit  Goal: Patient/family/caregiver demonstrates understanding of disease process, treatment plan, medications, and discharge instructions  Description: Complete learning assessment and assess knowledge base    Interventions:  - Provide teaching at level of understanding  - Provide teaching via preferred learning methods  Outcome: Progressing     Problem: METABOLIC, FLUID AND ELECTROLYTES - ADULT  Goal: Electrolytes maintained within normal limits  Description: INTERVENTIONS:  - Monitor labs and assess patient for signs and symptoms of electrolyte imbalances  - Administer electrolyte replacement as ordered  - Monitor response to electrolyte replacements, including repeat lab results as appropriate  - Instruct patient on fluid and nutrition as appropriate  Outcome: Progressing  Goal: Fluid balance maintained  Description: INTERVENTIONS:  - Monitor labs   - Monitor I/O and WT  - Instruct patient on fluid and nutrition as appropriate  - Assess for signs & symptoms of volume excess or deficit  Outcome: Progressing     Problem: MOBILITY - ADULT  Goal: Maintain or return to baseline ADL function  Description: INTERVENTIONS:  -  Assess patient's ability to carry out ADLs; assess patient's baseline for ADL function and identify physical deficits which impact ability to perform ADLs (bathing, care of mouth/teeth, toileting, grooming, dressing, etc )  - Assess/evaluate cause of self-care deficits   - Assess range of motion  - Assess patient's mobility; develop plan if impaired  - Assess patient's need for assistive devices and provide as appropriate  - Encourage maximum independence but intervene and supervise when necessary  - Involve family in performance of ADLs  - Assess for home care needs following discharge   - Consider OT consult to assist with ADL evaluation and planning for discharge  - Provide patient education as appropriate  Outcome: Progressing  Goal: Maintains/Returns to pre admission functional level  Description: INTERVENTIONS:  - Perform BMAT or MOVE assessment daily    - Set and communicate daily mobility goal to care team and patient/family/caregiver     - Collaborate with rehabilitation services on mobility goals if consulted  - Out of bed for meals 3 times a day  - Out of bed for toileting  - Record patient progress and toleration of activity level   Outcome: Progressing     Problem: Potential for Falls  Goal: Patient will remain free of falls  Description: INTERVENTIONS:  - Educate patient/family on patient safety including physical limitations  - Instruct patient to call for assistance with activity   - Consult OT/PT to assist with strengthening/mobility   - Keep Call bell within reach  - Keep bed low and locked with side rails adjusted as appropriate  - Keep care items and personal belongings within reach  - Initiate and maintain comfort rounds  - Apply yellow socks and bracelet for high fall risk patients  - Consider moving patient to room near nurses station  Outcome: Progressing

## 2022-12-21 NOTE — ASSESSMENT & PLAN NOTE
History of IV drug abuse, UDS positive for cocaine  · Previously on Suboxone, has been off it for several days, will hold off at this time  · Remains without signs of withdrawal  · Referred to HOST program

## 2022-12-21 NOTE — ASSESSMENT & PLAN NOTE
41-year-old male presented to institution due to left lower extremity pain after he woke up from pain as a result of " overdoing it" in the setting of drug use  · CK levels continue to downtrend    · Received generous IV fluids  · Renal function continues to improve   · Per my discussion with nephrology the patient is appropriate for discharge home  · He will obtain follow-up blood work on outpatient basis and is referred for an outpatient follow-up appointment with nephrology  · He is referred to a HOST program by case management to help with his substance dependence      Recent Labs     12/19/22  0545 12/20/22  0605 12/21/22  1005   CKTOTAL 2,083* 1,385* 1,040*

## 2022-12-21 NOTE — ASSESSMENT & PLAN NOTE
· Left lower extremity greater than right   · Doppler ultrasound negative for DVT  Suspect that this is due to post trauma which resulted in his rhabdomyolysis   Improving, but still edematous   · Anticipate improvement with increase ambulation upon discharge

## 2022-12-22 ENCOUNTER — TELEPHONE (OUTPATIENT)
Dept: NEPHROLOGY | Facility: CLINIC | Age: 32
End: 2022-12-22

## 2022-12-22 DIAGNOSIS — N17.9 AKI (ACUTE KIDNEY INJURY) (HCC): Primary | ICD-10-CM

## 2022-12-22 NOTE — LETTER
[facility] [address] [address]  Phone#  [phone]  Fax#  [phone]      December 29, 2022                 We have attempted to reach you regarding scheduling a hospital follow up  Your health and medical care are very important to us  Please call our office as soon as possible at [phone] so we can schedule your appointment  We apologize for any inconvenience this may cause and thank you in advance for your cooperation and assistance      Sincerely,  [facility]

## 2022-12-22 NOTE — TELEPHONE ENCOUNTER
----- Message from Yariel Uribe MD sent at 12/22/2022  8:03 AM EST -----  He got discharged from [facility] yesterday  Needs weekly BMP for next 2 weeks and SUSANA follow-up with [facility] in next 2-3 weeks

## 2023-11-17 NOTE — PLAN OF CARE
Problem: PAIN - ADULT  Goal: Verbalizes/displays adequate comfort level or baseline comfort level  Description: Interventions:  - Encourage patient to monitor pain and request assistance  - Assess pain using appropriate pain scale  - Administer analgesics based on type and severity of pain and evaluate response  - Implement non-pharmacological measures as appropriate and evaluate response  - Consider cultural and social influences on pain and pain management  - Notify physician/advanced practitioner if interventions unsuccessful or patient reports new pain  Outcome: Progressing     Problem: INFECTION - ADULT  Goal: Absence or prevention of progression during hospitalization  Description: INTERVENTIONS:  - Assess and monitor for signs and symptoms of infection  - Monitor lab/diagnostic results  - Monitor all insertion sites, i e  indwelling lines, tubes, and drains  - Monitor endotracheal if appropriate and nasal secretions for changes in amount and color  - [place] appropriate cooling/warming therapies per order  - Administer medications as ordered  - Instruct and encourage patient and family to use good hand hygiene technique  - Identify and instruct in appropriate isolation precautions for identified infection/condition  Outcome: Progressing     Problem: SAFETY ADULT  Goal: Patient will remain free of falls  Description: INTERVENTIONS:  - Educate patient/family on patient safety including physical limitations  - Instruct patient to call for assistance with activity   - Consult OT/PT to assist with strengthening/mobility   - Keep Call bell within reach  - Keep bed low and locked with side rails adjusted as appropriate  - Keep care items and personal belongings within reach  - Initiate and maintain comfort rounds  - Apply yellow socks and bracelet for high fall risk patients  - Consider moving patient to room near nurses station  Outcome: Progressing  Goal: Maintain or return to baseline ADL function  Description: INTERVENTIONS:  -  Assess patient's ability to carry out ADLs; assess patient's baseline for ADL function and identify physical deficits which impact ability to perform ADLs (bathing, care of mouth/teeth, toileting, grooming, dressing, etc )  - Assess/evaluate cause of self-care deficits   - Assess range of motion  - Assess patient's mobility; develop plan if impaired  - Assess patient's need for assistive devices and provide as appropriate  - Encourage maximum independence but intervene and supervise when necessary  - Involve family in performance of ADLs  - Assess for home care needs following discharge   - Consider OT consult to assist with ADL evaluation and planning for discharge  - Provide patient education as appropriate  Outcome: Progressing  Goal: Maintains/Returns to pre admission functional level  Description: INTERVENTIONS:  - Perform BMAT or MOVE assessment daily    - Set and communicate daily mobility goal to care team and patient/family/caregiver     - Collaborate with rehabilitation services on mobility goals if consulted  - Out of bed for meals 3 times a day  - Out of bed for toileting  - Record patient progress and toleration of activity level   Outcome: Progressing     Problem: DISCHARGE PLANNING  Goal: Discharge to home or other facility with appropriate resources  Description: INTERVENTIONS:  - Identify barriers to discharge w/patient and caregiver  - Arrange for needed discharge resources and transportation as appropriate  - Identify discharge learning needs (meds, wound care, etc )  - Arrange for interpretive services to assist at discharge as needed  - Refer to Case Management Department for coordinating discharge planning if the patient needs post-hospital services based on physician/advanced practitioner order or complex needs related to functional status, cognitive ability, or social support system  Outcome: Progressing     Problem: Knowledge Deficit  Goal: Patient/family/caregiver demonstrates understanding of disease process, treatment plan, medications, and discharge instructions  Description: Complete learning assessment and assess knowledge base    Interventions:  - Provide teaching at level of understanding  - Provide teaching via preferred learning methods  Outcome: Progressing     Problem: METABOLIC, FLUID AND ELECTROLYTES - ADULT  Goal: Electrolytes maintained within normal limits  Description: INTERVENTIONS:  - Monitor labs and assess patient for signs and symptoms of electrolyte imbalances  - Administer electrolyte replacement as ordered  - Monitor response to electrolyte replacements, including repeat lab results as appropriate  - Instruct patient on fluid and nutrition as appropriate  Outcome: Progressing  Goal: Fluid balance maintained  Description: INTERVENTIONS:  - Monitor labs   - Monitor I/O and WT  - Instruct patient on fluid and nutrition as appropriate  - Assess for signs & symptoms of volume excess or deficit  Outcome: Progressing     Problem: MOBILITY - ADULT  Goal: Maintain or return to baseline ADL function  Description: INTERVENTIONS:  -  Assess patient's ability to carry out ADLs; assess patient's baseline for ADL function and identify physical deficits which impact ability to perform ADLs (bathing, care of mouth/teeth, toileting, grooming, dressing, etc )  - Assess/evaluate cause of self-care deficits   - Assess range of motion  - Assess patient's mobility; develop plan if impaired  - Assess patient's need for assistive devices and provide as appropriate  - Encourage maximum independence but intervene and supervise when necessary  - Involve family in performance of ADLs  - Assess for home care needs following discharge   - Consider OT consult to assist with ADL evaluation and planning for discharge  - Provide patient education as appropriate  Outcome: Progressing  Goal: Maintains/Returns to pre admission functional level  Description: INTERVENTIONS:  - Perform BMAT or MOVE assessment daily    - Set and communicate daily mobility goal to care team and patient/family/caregiver     - Collaborate with rehabilitation services on mobility goals if consulted  - Out of bed for meals 3 times a day  - Out of bed for toileting  - Record patient progress and toleration of activity level   Outcome: Progressing     Problem: Potential for Falls  Goal: Patient will remain free of falls  Description: INTERVENTIONS:  - Educate patient/family on patient safety including physical limitations  - Instruct patient to call for assistance with activity   - Consult OT/PT to assist with strengthening/mobility   - Keep Call bell within reach  - Keep bed low and locked with side rails adjusted as appropriate  - Keep care items and personal belongings within reach  - Initiate and maintain comfort rounds  - Apply yellow socks and bracelet for high fall risk patients  - Consider moving patient to room near nurses station  Outcome: Progressing PAIN